# Patient Record
Sex: MALE | Race: WHITE | NOT HISPANIC OR LATINO | URBAN - METROPOLITAN AREA
[De-identification: names, ages, dates, MRNs, and addresses within clinical notes are randomized per-mention and may not be internally consistent; named-entity substitution may affect disease eponyms.]

---

## 2019-03-22 ENCOUNTER — EMERGENCY (EMERGENCY)
Facility: HOSPITAL | Age: 84
LOS: 1 days | Discharge: HOME | End: 2019-03-22
Attending: EMERGENCY MEDICINE
Payer: MEDICARE

## 2019-03-22 VITALS
RESPIRATION RATE: 16 BRPM | DIASTOLIC BLOOD PRESSURE: 71 MMHG | HEART RATE: 64 BPM | OXYGEN SATURATION: 98 % | WEIGHT: 139.99 LBS | SYSTOLIC BLOOD PRESSURE: 129 MMHG | TEMPERATURE: 98 F

## 2019-03-22 VITALS
SYSTOLIC BLOOD PRESSURE: 135 MMHG | HEART RATE: 63 BPM | RESPIRATION RATE: 16 BRPM | OXYGEN SATURATION: 100 % | DIASTOLIC BLOOD PRESSURE: 59 MMHG | TEMPERATURE: 99 F

## 2019-03-22 LAB
ALBUMIN SERPL ELPH-MCNC: 2.6 G/DL — LOW (ref 3.5–5)
ALP SERPL-CCNC: 93 U/L — SIGNIFICANT CHANGE UP (ref 40–120)
ALT FLD-CCNC: 36 U/L DA — SIGNIFICANT CHANGE UP (ref 10–60)
ANION GAP SERPL CALC-SCNC: 5 MMOL/L — SIGNIFICANT CHANGE UP (ref 5–17)
APPEARANCE UR: CLEAR — SIGNIFICANT CHANGE UP
APTT BLD: 33.4 SEC — SIGNIFICANT CHANGE UP (ref 27.5–36.3)
AST SERPL-CCNC: 19 U/L — SIGNIFICANT CHANGE UP (ref 10–40)
BASOPHILS # BLD AUTO: 0.04 K/UL — SIGNIFICANT CHANGE UP (ref 0–0.2)
BASOPHILS NFR BLD AUTO: 0.5 % — SIGNIFICANT CHANGE UP (ref 0–2)
BILIRUB SERPL-MCNC: 0.4 MG/DL — SIGNIFICANT CHANGE UP (ref 0.2–1.2)
BILIRUB UR-MCNC: NEGATIVE — SIGNIFICANT CHANGE UP
BUN SERPL-MCNC: 19 MG/DL — HIGH (ref 7–18)
CALCIUM SERPL-MCNC: 8.7 MG/DL — SIGNIFICANT CHANGE UP (ref 8.4–10.5)
CHLORIDE SERPL-SCNC: 104 MMOL/L — SIGNIFICANT CHANGE UP (ref 96–108)
CO2 SERPL-SCNC: 31 MMOL/L — SIGNIFICANT CHANGE UP (ref 22–31)
COLOR SPEC: YELLOW — SIGNIFICANT CHANGE UP
CREAT SERPL-MCNC: 1.11 MG/DL — SIGNIFICANT CHANGE UP (ref 0.5–1.3)
DIFF PNL FLD: ABNORMAL
EOSINOPHIL # BLD AUTO: 0.15 K/UL — SIGNIFICANT CHANGE UP (ref 0–0.5)
EOSINOPHIL NFR BLD AUTO: 1.8 % — SIGNIFICANT CHANGE UP (ref 0–6)
GLUCOSE SERPL-MCNC: 82 MG/DL — SIGNIFICANT CHANGE UP (ref 70–99)
GLUCOSE UR QL: NEGATIVE — SIGNIFICANT CHANGE UP
HCT VFR BLD CALC: 37.8 % — LOW (ref 39–50)
HGB BLD-MCNC: 12.1 G/DL — LOW (ref 13–17)
IMM GRANULOCYTES NFR BLD AUTO: 0.9 % — SIGNIFICANT CHANGE UP (ref 0–1.5)
INR BLD: 1.1 RATIO — SIGNIFICANT CHANGE UP (ref 0.88–1.16)
KETONES UR-MCNC: NEGATIVE — SIGNIFICANT CHANGE UP
LACTATE SERPL-SCNC: 1 MMOL/L — SIGNIFICANT CHANGE UP (ref 0.7–2)
LEUKOCYTE ESTERASE UR-ACNC: ABNORMAL
LYMPHOCYTES # BLD AUTO: 1.62 K/UL — SIGNIFICANT CHANGE UP (ref 1–3.3)
LYMPHOCYTES # BLD AUTO: 19 % — SIGNIFICANT CHANGE UP (ref 13–44)
MCHC RBC-ENTMCNC: 29.5 PG — SIGNIFICANT CHANGE UP (ref 27–34)
MCHC RBC-ENTMCNC: 32 GM/DL — SIGNIFICANT CHANGE UP (ref 32–36)
MCV RBC AUTO: 92.2 FL — SIGNIFICANT CHANGE UP (ref 80–100)
MONOCYTES # BLD AUTO: 0.97 K/UL — HIGH (ref 0–0.9)
MONOCYTES NFR BLD AUTO: 11.4 % — SIGNIFICANT CHANGE UP (ref 2–14)
NEUTROPHILS # BLD AUTO: 5.67 K/UL — SIGNIFICANT CHANGE UP (ref 1.8–7.4)
NEUTROPHILS NFR BLD AUTO: 66.4 % — SIGNIFICANT CHANGE UP (ref 43–77)
NITRITE UR-MCNC: NEGATIVE — SIGNIFICANT CHANGE UP
NRBC # BLD: 0 /100 WBCS — SIGNIFICANT CHANGE UP (ref 0–0)
PH UR: 7 — SIGNIFICANT CHANGE UP (ref 5–8)
PLATELET # BLD AUTO: 289 K/UL — SIGNIFICANT CHANGE UP (ref 150–400)
POTASSIUM SERPL-MCNC: 4.1 MMOL/L — SIGNIFICANT CHANGE UP (ref 3.5–5.3)
POTASSIUM SERPL-SCNC: 4.1 MMOL/L — SIGNIFICANT CHANGE UP (ref 3.5–5.3)
PROT SERPL-MCNC: 6.9 G/DL — SIGNIFICANT CHANGE UP (ref 6–8.3)
PROT UR-MCNC: 15
PROTHROM AB SERPL-ACNC: 12.3 SEC — SIGNIFICANT CHANGE UP (ref 10–12.9)
RBC # BLD: 4.1 M/UL — LOW (ref 4.2–5.8)
RBC # FLD: 13 % — SIGNIFICANT CHANGE UP (ref 10.3–14.5)
SODIUM SERPL-SCNC: 140 MMOL/L — SIGNIFICANT CHANGE UP (ref 135–145)
SP GR SPEC: 1.01 — SIGNIFICANT CHANGE UP (ref 1.01–1.02)
TROPONIN I SERPL-MCNC: <0.015 NG/ML — SIGNIFICANT CHANGE UP (ref 0–0.04)
TSH SERPL-MCNC: 3.3 UU/ML — SIGNIFICANT CHANGE UP (ref 0.34–4.82)
UROBILINOGEN FLD QL: 1
WBC # BLD: 8.53 K/UL — SIGNIFICANT CHANGE UP (ref 3.8–10.5)
WBC # FLD AUTO: 8.53 K/UL — SIGNIFICANT CHANGE UP (ref 3.8–10.5)

## 2019-03-22 PROCEDURE — 71045 X-RAY EXAM CHEST 1 VIEW: CPT

## 2019-03-22 PROCEDURE — 93005 ELECTROCARDIOGRAM TRACING: CPT

## 2019-03-22 PROCEDURE — 80053 COMPREHEN METABOLIC PANEL: CPT

## 2019-03-22 PROCEDURE — 83605 ASSAY OF LACTIC ACID: CPT

## 2019-03-22 PROCEDURE — 85610 PROTHROMBIN TIME: CPT

## 2019-03-22 PROCEDURE — 85027 COMPLETE CBC AUTOMATED: CPT

## 2019-03-22 PROCEDURE — 74177 CT ABD & PELVIS W/CONTRAST: CPT

## 2019-03-22 PROCEDURE — 81001 URINALYSIS AUTO W/SCOPE: CPT

## 2019-03-22 PROCEDURE — 99284 EMERGENCY DEPT VISIT MOD MDM: CPT

## 2019-03-22 PROCEDURE — 85730 THROMBOPLASTIN TIME PARTIAL: CPT

## 2019-03-22 PROCEDURE — 82962 GLUCOSE BLOOD TEST: CPT

## 2019-03-22 PROCEDURE — 99284 EMERGENCY DEPT VISIT MOD MDM: CPT | Mod: 25

## 2019-03-22 PROCEDURE — 84443 ASSAY THYROID STIM HORMONE: CPT

## 2019-03-22 PROCEDURE — 36415 COLL VENOUS BLD VENIPUNCTURE: CPT

## 2019-03-22 PROCEDURE — 84484 ASSAY OF TROPONIN QUANT: CPT

## 2019-03-22 PROCEDURE — 74177 CT ABD & PELVIS W/CONTRAST: CPT | Mod: 26

## 2019-03-22 PROCEDURE — 71045 X-RAY EXAM CHEST 1 VIEW: CPT | Mod: 26

## 2019-03-22 RX ORDER — ACETAMINOPHEN 500 MG
1000 TABLET ORAL ONCE
Qty: 0 | Refills: 0 | Status: DISCONTINUED | OUTPATIENT
Start: 2019-03-22 | End: 2019-03-26

## 2019-03-22 RX ORDER — SODIUM CHLORIDE 9 MG/ML
1950 INJECTION INTRAMUSCULAR; INTRAVENOUS; SUBCUTANEOUS ONCE
Qty: 0 | Refills: 0 | Status: DISCONTINUED | OUTPATIENT
Start: 2019-03-22 | End: 2019-03-26

## 2019-03-22 NOTE — ED PROVIDER NOTE - OBJECTIVE STATEMENT
84 y/o M patient with a significant PMHx of Dementia and no significant PSHx coming from nursing home presents to the ED with abdominal pain. Patient states he doesn't know how long he has been experiencing abdominal pain. Patient says whenever he eats he suddenly has loose bowel movements after. Patient denies chest pain, SOB, fever, chills, back pain, and any other complaints. Patient endorses being a poor historian and states he generally feels unwell. Patient denies any other complaints. NKDA.

## 2019-03-22 NOTE — ED ADULT NURSE NOTE - OBJECTIVE STATEMENT
Pt states has abd cramps, diarrhea  Sent from [facility] for increased lethargy  On arrival to ED, pt was awake , alert. oriented x 2 , ambulating with steady gait

## 2019-03-22 NOTE — ED PROVIDER NOTE - PROGRESS NOTE DETAILS
Patient resting well. No pain. No bowel movements in the ED. Remains awake and alert at baseline mentation. Imaging and labs unremarkable. Spoke with Dr. Villalta and reviewed patient's care. Ok with DC back to facility. Will DC.

## 2019-03-22 NOTE — ED ADULT NURSE NOTE - NSIMPLEMENTINTERV_GEN_ALL_ED
Implemented All Fall Risk Interventions:  [place] to call system. Call bell, personal items and telephone within reach. Instruct patient to call for assistance. Room bathroom lighting operational. Non-slip footwear when patient is off stretcher. Physically safe environment: no spills, clutter or unnecessary equipment. Stretcher in lowest position, wheels locked, appropriate side rails in place. Provide visual cue, wrist band, yellow gown, etc. Monitor gait and stability. Monitor for mental status changes and reorient to person, place, and time. Review medications for side effects contributing to fall risk. Reinforce activity limits and safety measures with patient and family.

## 2019-05-26 ENCOUNTER — EMERGENCY (EMERGENCY)
Facility: HOSPITAL | Age: 84
LOS: 1 days | Discharge: HOME | End: 2019-05-26
Attending: EMERGENCY MEDICINE
Payer: MEDICARE

## 2019-05-26 VITALS
TEMPERATURE: 99 F | RESPIRATION RATE: 16 BRPM | SYSTOLIC BLOOD PRESSURE: 134 MMHG | DIASTOLIC BLOOD PRESSURE: 64 MMHG | HEART RATE: 56 BPM | WEIGHT: 119.93 LBS | HEIGHT: 67 IN | OXYGEN SATURATION: 98 %

## 2019-05-26 VITALS
HEART RATE: 64 BPM | RESPIRATION RATE: 18 BRPM | OXYGEN SATURATION: 98 % | SYSTOLIC BLOOD PRESSURE: 148 MMHG | TEMPERATURE: 98 F | DIASTOLIC BLOOD PRESSURE: 68 MMHG

## 2019-05-26 PROBLEM — F03.90 UNSPECIFIED DEMENTIA WITHOUT BEHAVIORAL DISTURBANCE: Chronic | Status: ACTIVE | Noted: 2019-03-22

## 2019-05-26 PROCEDURE — 73620 X-RAY EXAM OF FOOT: CPT | Mod: 26,RT

## 2019-05-26 PROCEDURE — 99284 EMERGENCY DEPT VISIT MOD MDM: CPT | Mod: 25

## 2019-05-26 PROCEDURE — 90471 IMMUNIZATION ADMIN: CPT

## 2019-05-26 PROCEDURE — 73620 X-RAY EXAM OF FOOT: CPT

## 2019-05-26 PROCEDURE — 70450 CT HEAD/BRAIN W/O DYE: CPT

## 2019-05-26 PROCEDURE — 93010 ELECTROCARDIOGRAM REPORT: CPT

## 2019-05-26 PROCEDURE — 93005 ELECTROCARDIOGRAM TRACING: CPT

## 2019-05-26 PROCEDURE — 70450 CT HEAD/BRAIN W/O DYE: CPT | Mod: 26

## 2019-05-26 PROCEDURE — 99285 EMERGENCY DEPT VISIT HI MDM: CPT

## 2019-05-26 PROCEDURE — 99285 EMERGENCY DEPT VISIT HI MDM: CPT | Mod: 25

## 2019-05-26 PROCEDURE — 36430 TRANSFUSION BLD/BLD COMPNT: CPT

## 2019-05-26 PROCEDURE — 90715 TDAP VACCINE 7 YRS/> IM: CPT

## 2019-05-26 RX ORDER — TETANUS TOXOID, REDUCED DIPHTHERIA TOXOID AND ACELLULAR PERTUSSIS VACCINE, ADSORBED 5; 2.5; 8; 8; 2.5 [IU]/.5ML; [IU]/.5ML; UG/.5ML; UG/.5ML; UG/.5ML
0.5 SUSPENSION INTRAMUSCULAR ONCE
Refills: 0 | Status: COMPLETED | OUTPATIENT
Start: 2019-05-26 | End: 2019-05-26

## 2019-05-26 RX ADMIN — TETANUS TOXOID, REDUCED DIPHTHERIA TOXOID AND ACELLULAR PERTUSSIS VACCINE, ADSORBED 0.5 MILLILITER(S): 5; 2.5; 8; 8; 2.5 SUSPENSION INTRAMUSCULAR at 11:01

## 2019-05-26 NOTE — ED ADULT NURSE NOTE - NSIMPLEMENTINTERV_GEN_ALL_ED
Implemented All Fall Risk Interventions:  [place] to call system. Call bell, personal items and telephone within reach. Instruct patient to call for assistance. Room bathroom lighting operational. Non-slip footwear when patient is off stretcher. Physically safe environment: no spills, clutter or unnecessary equipment. Stretcher in lowest position, wheels locked, appropriate side rails in place. Provide visual cue, wrist band, yellow gown, etc. Monitor gait and stability. Monitor for mental status changes and reorient to person, place, and time. Review medications for side effects contributing to fall risk. Reinforce activity limits and safety measures with patient and family.

## 2019-05-26 NOTE — ED PROVIDER NOTE - CARE PLAN
Principal Discharge DX:	Toenail avulsion, initial encounter Principal Discharge DX:	Toenail avulsion, initial encounter  Secondary Diagnosis:	Fall, initial encounter

## 2019-05-26 NOTE — ED PROVIDER NOTE - OBJECTIVE STATEMENT
84 y/o M patient with a significant PMHx of Dementia and no significant PSHx presents to the ED with s/p trip and fall. Patient is bleeding form the right toe. Patient history limited due to dementia. NKDA.

## 2019-05-26 NOTE — ED ADULT TRIAGE NOTE - CHIEF COMPLAINT QUOTE
send from nursing home for trip and fall in the bathroom. c/o L toe pain. denies head  injury or trauma. denies LOC

## 2019-07-09 ENCOUNTER — EMERGENCY (EMERGENCY)
Facility: HOSPITAL | Age: 84
LOS: 1 days | Discharge: HOME | End: 2019-07-09
Attending: EMERGENCY MEDICINE
Payer: MEDICARE

## 2019-07-09 VITALS
RESPIRATION RATE: 18 BRPM | DIASTOLIC BLOOD PRESSURE: 60 MMHG | TEMPERATURE: 98 F | SYSTOLIC BLOOD PRESSURE: 120 MMHG | HEART RATE: 58 BPM | OXYGEN SATURATION: 99 %

## 2019-07-09 PROCEDURE — 73070 X-RAY EXAM OF ELBOW: CPT | Mod: 26,RT

## 2019-07-09 PROCEDURE — 99285 EMERGENCY DEPT VISIT HI MDM: CPT

## 2019-07-09 PROCEDURE — 70450 CT HEAD/BRAIN W/O DYE: CPT | Mod: 26

## 2019-07-09 PROCEDURE — 70450 CT HEAD/BRAIN W/O DYE: CPT

## 2019-07-09 PROCEDURE — 73070 X-RAY EXAM OF ELBOW: CPT

## 2019-07-09 PROCEDURE — 99285 EMERGENCY DEPT VISIT HI MDM: CPT | Mod: 25

## 2019-07-09 PROCEDURE — 90715 TDAP VACCINE 7 YRS/> IM: CPT

## 2019-07-09 PROCEDURE — 90471 IMMUNIZATION ADMIN: CPT

## 2019-07-09 RX ORDER — ACETAMINOPHEN 500 MG
650 TABLET ORAL ONCE
Refills: 0 | Status: COMPLETED | OUTPATIENT
Start: 2019-07-09 | End: 2019-07-09

## 2019-07-09 RX ORDER — TETANUS TOXOID, REDUCED DIPHTHERIA TOXOID AND ACELLULAR PERTUSSIS VACCINE, ADSORBED 5; 2.5; 8; 8; 2.5 [IU]/.5ML; [IU]/.5ML; UG/.5ML; UG/.5ML; UG/.5ML
0.5 SUSPENSION INTRAMUSCULAR ONCE
Refills: 0 | Status: COMPLETED | OUTPATIENT
Start: 2019-07-09 | End: 2019-07-09

## 2019-07-09 RX ADMIN — TETANUS TOXOID, REDUCED DIPHTHERIA TOXOID AND ACELLULAR PERTUSSIS VACCINE, ADSORBED 0.5 MILLILITER(S): 5; 2.5; 8; 8; 2.5 SUSPENSION INTRAMUSCULAR at 13:57

## 2019-07-09 RX ADMIN — Medication 650 MILLIGRAM(S): at 13:54

## 2019-07-09 NOTE — ED ADULT NURSE NOTE - NSIMPLEMENTINTERV_GEN_ALL_ED
Implemented All Universal Safety Interventions:  [place] to call system. Call bell, personal items and telephone within reach. Instruct patient to call for assistance. Room bathroom lighting operational. Non-slip footwear when patient is off stretcher. Physically safe environment: no spills, clutter or unnecessary equipment. Stretcher in lowest position, wheels locked, appropriate side rails in place.

## 2019-07-09 NOTE — ED PROVIDER NOTE - MUSCULOSKELETAL, MLM
Spine appears kyphotic, range of motion is not limited, no muscle or joint tenderness, Rt elbow- FROM, min effusion with superficial abrasion

## 2019-07-09 NOTE — ED ADULT NURSE NOTE - OBJECTIVE STATEMENT
BIB EMS alet and verbally responsive S/P mech fall today  +head trauma no LOC noted sustained abrasion to R elbow noted

## 2019-07-09 NOTE — ED PROVIDER NOTE - CARE PLAN
Principal Discharge DX:	Fall  Secondary Diagnosis:	Head injury  Secondary Diagnosis:	Elbow injury  Secondary Diagnosis:	Abrasion

## 2019-07-09 NOTE — ED PROVIDER NOTE - OBJECTIVE STATEMENT
84 y.o. male AL resident BIBA pt claims he got into the shower this am, pt slipped & fell, pt hit Rt sided head, no LOC, pt also sustained abrasion to Rt elbow. c/o Rt sided HA, pt walks with a walker, denies other injuries neck pain, CP, SOB, last tetanus-?

## 2019-07-15 ENCOUNTER — EMERGENCY (EMERGENCY)
Facility: HOSPITAL | Age: 84
LOS: 1 days | Discharge: HOME | End: 2019-07-15
Attending: EMERGENCY MEDICINE
Payer: MEDICARE

## 2019-07-15 VITALS
DIASTOLIC BLOOD PRESSURE: 63 MMHG | SYSTOLIC BLOOD PRESSURE: 152 MMHG | RESPIRATION RATE: 20 BRPM | TEMPERATURE: 97 F | HEART RATE: 55 BPM | OXYGEN SATURATION: 100 %

## 2019-07-15 VITALS
TEMPERATURE: 98 F | SYSTOLIC BLOOD PRESSURE: 139 MMHG | RESPIRATION RATE: 20 BRPM | OXYGEN SATURATION: 100 % | WEIGHT: 130.07 LBS | DIASTOLIC BLOOD PRESSURE: 80 MMHG | HEIGHT: 64 IN | HEART RATE: 53 BPM

## 2019-07-15 PROBLEM — F32.9 MAJOR DEPRESSIVE DISORDER, SINGLE EPISODE, UNSPECIFIED: Chronic | Status: ACTIVE | Noted: 2019-07-09

## 2019-07-15 PROBLEM — E03.9 HYPOTHYROIDISM, UNSPECIFIED: Chronic | Status: ACTIVE | Noted: 2019-07-09

## 2019-07-15 LAB
ALBUMIN SERPL ELPH-MCNC: 3.2 G/DL — LOW (ref 3.5–5)
ALP SERPL-CCNC: 120 U/L — SIGNIFICANT CHANGE UP (ref 40–120)
ALT FLD-CCNC: 34 U/L DA — SIGNIFICANT CHANGE UP (ref 10–60)
ANION GAP SERPL CALC-SCNC: 7 MMOL/L — SIGNIFICANT CHANGE UP (ref 5–17)
AST SERPL-CCNC: 23 U/L — SIGNIFICANT CHANGE UP (ref 10–40)
BASOPHILS # BLD AUTO: 0.04 K/UL — SIGNIFICANT CHANGE UP (ref 0–0.2)
BASOPHILS NFR BLD AUTO: 0.6 % — SIGNIFICANT CHANGE UP (ref 0–2)
BILIRUB SERPL-MCNC: 0.5 MG/DL — SIGNIFICANT CHANGE UP (ref 0.2–1.2)
BUN SERPL-MCNC: 22 MG/DL — HIGH (ref 7–18)
CALCIUM SERPL-MCNC: 9 MG/DL — SIGNIFICANT CHANGE UP (ref 8.4–10.5)
CHLORIDE SERPL-SCNC: 105 MMOL/L — SIGNIFICANT CHANGE UP (ref 96–108)
CO2 SERPL-SCNC: 28 MMOL/L — SIGNIFICANT CHANGE UP (ref 22–31)
CREAT SERPL-MCNC: 1.14 MG/DL — SIGNIFICANT CHANGE UP (ref 0.5–1.3)
EOSINOPHIL # BLD AUTO: 0.28 K/UL — SIGNIFICANT CHANGE UP (ref 0–0.5)
EOSINOPHIL NFR BLD AUTO: 3.9 % — SIGNIFICANT CHANGE UP (ref 0–6)
GLUCOSE SERPL-MCNC: 77 MG/DL — SIGNIFICANT CHANGE UP (ref 70–99)
HCT VFR BLD CALC: 39.9 % — SIGNIFICANT CHANGE UP (ref 39–50)
HGB BLD-MCNC: 13.2 G/DL — SIGNIFICANT CHANGE UP (ref 13–17)
IMM GRANULOCYTES NFR BLD AUTO: 0.4 % — SIGNIFICANT CHANGE UP (ref 0–1.5)
LYMPHOCYTES # BLD AUTO: 2.09 K/UL — SIGNIFICANT CHANGE UP (ref 1–3.3)
LYMPHOCYTES # BLD AUTO: 29.3 % — SIGNIFICANT CHANGE UP (ref 13–44)
MCHC RBC-ENTMCNC: 29.5 PG — SIGNIFICANT CHANGE UP (ref 27–34)
MCHC RBC-ENTMCNC: 33.1 GM/DL — SIGNIFICANT CHANGE UP (ref 32–36)
MCV RBC AUTO: 89.1 FL — SIGNIFICANT CHANGE UP (ref 80–100)
MONOCYTES # BLD AUTO: 0.74 K/UL — SIGNIFICANT CHANGE UP (ref 0–0.9)
MONOCYTES NFR BLD AUTO: 10.4 % — SIGNIFICANT CHANGE UP (ref 2–14)
NEUTROPHILS # BLD AUTO: 3.95 K/UL — SIGNIFICANT CHANGE UP (ref 1.8–7.4)
NEUTROPHILS NFR BLD AUTO: 55.4 % — SIGNIFICANT CHANGE UP (ref 43–77)
NRBC # BLD: 0 /100 WBCS — SIGNIFICANT CHANGE UP (ref 0–0)
PLATELET # BLD AUTO: 186 K/UL — SIGNIFICANT CHANGE UP (ref 150–400)
POTASSIUM SERPL-MCNC: 4 MMOL/L — SIGNIFICANT CHANGE UP (ref 3.5–5.3)
POTASSIUM SERPL-SCNC: 4 MMOL/L — SIGNIFICANT CHANGE UP (ref 3.5–5.3)
PROT SERPL-MCNC: 6.9 G/DL — SIGNIFICANT CHANGE UP (ref 6–8.3)
RBC # BLD: 4.48 M/UL — SIGNIFICANT CHANGE UP (ref 4.2–5.8)
RBC # FLD: 14.8 % — HIGH (ref 10.3–14.5)
SODIUM SERPL-SCNC: 140 MMOL/L — SIGNIFICANT CHANGE UP (ref 135–145)
TROPONIN I SERPL-MCNC: <0.015 NG/ML — SIGNIFICANT CHANGE UP (ref 0–0.04)
WBC # BLD: 7.13 K/UL — SIGNIFICANT CHANGE UP (ref 3.8–10.5)
WBC # FLD AUTO: 7.13 K/UL — SIGNIFICANT CHANGE UP (ref 3.8–10.5)

## 2019-07-15 PROCEDURE — 72125 CT NECK SPINE W/O DYE: CPT | Mod: 26

## 2019-07-15 PROCEDURE — 70450 CT HEAD/BRAIN W/O DYE: CPT | Mod: 26

## 2019-07-15 PROCEDURE — 73630 X-RAY EXAM OF FOOT: CPT | Mod: 26,LT

## 2019-07-15 PROCEDURE — 84484 ASSAY OF TROPONIN QUANT: CPT

## 2019-07-15 PROCEDURE — 72125 CT NECK SPINE W/O DYE: CPT

## 2019-07-15 PROCEDURE — 93005 ELECTROCARDIOGRAM TRACING: CPT

## 2019-07-15 PROCEDURE — 36415 COLL VENOUS BLD VENIPUNCTURE: CPT

## 2019-07-15 PROCEDURE — 73630 X-RAY EXAM OF FOOT: CPT

## 2019-07-15 PROCEDURE — 80053 COMPREHEN METABOLIC PANEL: CPT

## 2019-07-15 PROCEDURE — 99284 EMERGENCY DEPT VISIT MOD MDM: CPT | Mod: 25

## 2019-07-15 PROCEDURE — 85027 COMPLETE CBC AUTOMATED: CPT

## 2019-07-15 PROCEDURE — 72170 X-RAY EXAM OF PELVIS: CPT

## 2019-07-15 PROCEDURE — 70450 CT HEAD/BRAIN W/O DYE: CPT

## 2019-07-15 PROCEDURE — 72170 X-RAY EXAM OF PELVIS: CPT | Mod: 26

## 2019-07-15 PROCEDURE — 99285 EMERGENCY DEPT VISIT HI MDM: CPT

## 2019-07-15 PROCEDURE — 82962 GLUCOSE BLOOD TEST: CPT

## 2019-07-15 NOTE — ED PROVIDER NOTE - OBJECTIVE STATEMENT
85 y/o male with PMHx of dementia, frequent falls sent in from [facility] to the ED for eval s/p fall x today. Pt noted to be a poor historian but states he would occasionally feel dizzy and fall. Pt in ED only with L foot pain. Pt denies numbness, tingling, or any other complaints. NKDA.

## 2019-07-15 NOTE — ED ADULT NURSE NOTE - NSIMPLEMENTINTERV_GEN_ALL_ED
Implemented All Universal Safety Interventions:  [place] to call system. Call bell, personal items and telephone within reach. Instruct patient to call for assistance. Room bathroom lighting operational. Non-slip footwear when patient is off stretcher. Physically safe environment: no spills, clutter or unnecessary equipment. Stretcher in lowest position, wheels locked, appropriate side rails in place.

## 2019-07-15 NOTE — ED PROVIDER NOTE - CLINICAL SUMMARY MEDICAL DECISION MAKING FREE TEXT BOX
84 M with possible fall x today. Given age and dementia, will CT head and c-spine, check labs and reassess.

## 2019-07-15 NOTE — ED ADULT NURSE NOTE - OBJECTIVE STATEMENT
pt is here for weakness.  pt stated that feeling dizzy, c/o weakness, denied sob or fever, denied N/V/D or chest pain, pt calm at this time.

## 2019-07-15 NOTE — ED PROVIDER NOTE - CONSTITUTIONAL, MLM
normal... Well appearing, well nourished, awake, alert, oriented to person, place, time/situation and in no apparent distress. yes

## 2019-07-15 NOTE — ED PROVIDER NOTE - MUSCULOSKELETAL MINIMAL EXAM
L foot with no tenderness/deformity/edema, pt with numerous aged ecchymoses but no acute ecchymoses noted

## 2019-07-15 NOTE — ED PROVIDER NOTE - PROGRESS NOTE DETAILS
Spoke with Dr. Villalta, if CT negative with nml labs, can d/c pt back to [facility]. Gregorio: s/o from Dr segura to f/u on imaging.  ct head and cervical no acute injury.  pelvis and left foot neg for acute injury.  pt able to stand up.  states wants to go back to [facility].   dx fall.  ambulance back to [facility]

## 2020-02-07 ENCOUNTER — EMERGENCY (EMERGENCY)
Facility: HOSPITAL | Age: 85
LOS: 1 days | Discharge: HOME | End: 2020-02-07
Attending: EMERGENCY MEDICINE
Payer: MEDICARE

## 2020-02-07 VITALS
RESPIRATION RATE: 16 BRPM | TEMPERATURE: 99 F | SYSTOLIC BLOOD PRESSURE: 131 MMHG | DIASTOLIC BLOOD PRESSURE: 68 MMHG | WEIGHT: 139.99 LBS | HEART RATE: 62 BPM | HEIGHT: 63 IN | OXYGEN SATURATION: 97 %

## 2020-02-07 PROCEDURE — 99284 EMERGENCY DEPT VISIT MOD MDM: CPT | Mod: 25

## 2020-02-07 PROCEDURE — 93005 ELECTROCARDIOGRAM TRACING: CPT

## 2020-02-07 PROCEDURE — 99285 EMERGENCY DEPT VISIT HI MDM: CPT

## 2020-02-07 PROCEDURE — 72125 CT NECK SPINE W/O DYE: CPT | Mod: 26

## 2020-02-07 PROCEDURE — 72125 CT NECK SPINE W/O DYE: CPT

## 2020-02-07 PROCEDURE — 70450 CT HEAD/BRAIN W/O DYE: CPT | Mod: 26

## 2020-02-07 PROCEDURE — 70450 CT HEAD/BRAIN W/O DYE: CPT

## 2020-02-07 NOTE — ED PROVIDER NOTE - ENMT, MLM
Airway patent, Nasal mucosa clear. Mouth with normal mucosa. Throat has no vesicles, no oropharyngeal exudates and uvula is midline. Head atraumatic and normocephalic with no ecchymosis.

## 2020-02-07 NOTE — ED PROVIDER NOTE - OBJECTIVE STATEMENT
84 year old male with PMHx of dementia, depression, hypothyroidism, and frequent falls and no pertinent PSHx presents to the ED from [facility] S/P a mechanical fall today. Patient reports that he stood up quickly from his bed, developed some dizziness, and then fell landing on the right side of his head. Patient states that he was able to get up independently after the fall. Patient is presenting to the ED because his assisted living facility wanted him to undergo a CAT scan. In the ED, patient states that he is only here for a CAT scan so that he can go home soon. Patient otherwise denies any loss of consciousness, chest pain, shortness of breath, pain, and all other acute complaints. Patient denies any blood thinner use. NKDA.

## 2020-02-07 NOTE — ED ADULT NURSE NOTE - NS ED NOTE ABUSE SUSPICION NEGLECT YN
Agency/Facility Name: [facility]   Spoke To: Rozina  Outcome: Rozina to follow up with this [facility] tomorrow regarding bed availability.      No

## 2020-02-07 NOTE — ED PROVIDER NOTE - PATIENT PORTAL LINK FT
You can access the FollowMyHealth Patient Portal offered by [facility] by registering at the following website: [email or web address]. By joining [employer]’s FollowMyHealth portal, you will also be able to view your health information using other applications (apps) compatible with our system.

## 2020-02-07 NOTE — ED ADULT NURSE NOTE - OBJECTIVE STATEMENT
Patient fell on buttocks while standing, hit the back of head on carpeted floor.  No LOC.  Sent from [facility] for evaluation.  Pt states he fell in his bedroom at the [facility], c/o of lower back pain due to fall. Pt states he ambulates with assistance with his cane.

## 2020-02-07 NOTE — ED PROVIDER NOTE - PROGRESS NOTE DETAILS
Ervin: signed out for f/u ct. ct negative for acute pathology. will dc back to [facility]. f/u PMD. return precautions discussed.

## 2020-02-07 NOTE — ED ADULT TRIAGE NOTE - CHIEF COMPLAINT QUOTE
Patient fell on buttocks while standing, hit the back of head on carpeted floor.  No LOC.  Sent from Assisted Living Home for evaluation

## 2020-02-07 NOTE — ED ADULT NURSE NOTE - NSIMPLEMENTINTERV_GEN_ALL_ED
Implemented All Fall with Harm Risk Interventions:  [place] to call system. Call bell, personal items and telephone within reach. Instruct patient to call for assistance. Room bathroom lighting operational. Non-slip footwear when patient is off stretcher. Physically safe environment: no spills, clutter or unnecessary equipment. Stretcher in lowest position, wheels locked, appropriate side rails in place. Provide visual cue, wrist band, yellow gown, etc. Monitor gait and stability. Monitor for mental status changes and reorient to person, place, and time. Review medications for side effects contributing to fall risk. Reinforce activity limits and safety measures with patient and family. Provide visual clues: red socks.

## 2020-02-07 NOTE — ED PROVIDER NOTE - CLINICAL SUMMARY MEDICAL DECISION MAKING FREE TEXT BOX
Patient presents to the ED with complaints of a fall and blunt head trauma secondary to likely abrupt positional changes/ orthostatic hypothyroidism. Will check head CT/ cervical neck, and EKG. Will discharge if back to normal.

## 2020-02-08 VITALS
TEMPERATURE: 99 F | RESPIRATION RATE: 16 BRPM | HEART RATE: 67 BPM | DIASTOLIC BLOOD PRESSURE: 67 MMHG | SYSTOLIC BLOOD PRESSURE: 143 MMHG | OXYGEN SATURATION: 100 %

## 2020-02-08 PROBLEM — R29.6 REPEATED FALLS: Chronic | Status: ACTIVE | Noted: 2019-07-18

## 2020-02-08 NOTE — ED ADULT NURSE REASSESSMENT NOTE - NS ED NURSE REASSESS COMMENT FT1
Pt discharged, called facility [facility], spoke with RN supervisor Dariusz Larkin to inform about patient's transport back to facility. Pt wheeled on wheelchair in no distress by [facility], no IV.

## 2020-05-14 ENCOUNTER — EMERGENCY (EMERGENCY)
Facility: HOSPITAL | Age: 85
LOS: 1 days | Discharge: HOME | End: 2020-05-14
Attending: EMERGENCY MEDICINE
Payer: MEDICARE

## 2020-05-14 VITALS
DIASTOLIC BLOOD PRESSURE: 75 MMHG | SYSTOLIC BLOOD PRESSURE: 177 MMHG | TEMPERATURE: 98 F | HEART RATE: 63 BPM | RESPIRATION RATE: 18 BRPM | OXYGEN SATURATION: 100 %

## 2020-05-14 VITALS
SYSTOLIC BLOOD PRESSURE: 122 MMHG | WEIGHT: 119.93 LBS | OXYGEN SATURATION: 99 % | TEMPERATURE: 98 F | DIASTOLIC BLOOD PRESSURE: 81 MMHG | RESPIRATION RATE: 19 BRPM | HEART RATE: 61 BPM | HEIGHT: 66 IN

## 2020-05-14 LAB
ACETONE SERPL-MCNC: NEGATIVE — SIGNIFICANT CHANGE UP
ALBUMIN SERPL ELPH-MCNC: 3.1 G/DL — LOW (ref 3.5–5)
ALP SERPL-CCNC: 108 U/L — SIGNIFICANT CHANGE UP (ref 40–120)
ALT FLD-CCNC: 13 U/L DA — SIGNIFICANT CHANGE UP (ref 10–60)
ANION GAP SERPL CALC-SCNC: 5 MMOL/L — SIGNIFICANT CHANGE UP (ref 5–17)
AST SERPL-CCNC: 17 U/L — SIGNIFICANT CHANGE UP (ref 10–40)
BASOPHILS # BLD AUTO: 0.03 K/UL — SIGNIFICANT CHANGE UP (ref 0–0.2)
BASOPHILS NFR BLD AUTO: 0.5 % — SIGNIFICANT CHANGE UP (ref 0–2)
BILIRUB SERPL-MCNC: 0.5 MG/DL — SIGNIFICANT CHANGE UP (ref 0.2–1.2)
BUN SERPL-MCNC: 22 MG/DL — HIGH (ref 7–18)
CALCIUM SERPL-MCNC: 8.8 MG/DL — SIGNIFICANT CHANGE UP (ref 8.4–10.5)
CHLORIDE SERPL-SCNC: 107 MMOL/L — SIGNIFICANT CHANGE UP (ref 96–108)
CK SERPL-CCNC: 61 U/L — SIGNIFICANT CHANGE UP (ref 35–232)
CO2 SERPL-SCNC: 30 MMOL/L — SIGNIFICANT CHANGE UP (ref 22–31)
CREAT SERPL-MCNC: 1.07 MG/DL — SIGNIFICANT CHANGE UP (ref 0.5–1.3)
D DIMER BLD IA.RAPID-MCNC: 453 NG/ML DDU — HIGH
EOSINOPHIL # BLD AUTO: 0.14 K/UL — SIGNIFICANT CHANGE UP (ref 0–0.5)
EOSINOPHIL NFR BLD AUTO: 2.3 % — SIGNIFICANT CHANGE UP (ref 0–6)
FIBRINOGEN PPP-MCNC: 534 MG/DL — HIGH (ref 350–510)
GLUCOSE SERPL-MCNC: 70 MG/DL — SIGNIFICANT CHANGE UP (ref 70–99)
HCT VFR BLD CALC: 39.7 % — SIGNIFICANT CHANGE UP (ref 39–50)
HGB BLD-MCNC: 13.2 G/DL — SIGNIFICANT CHANGE UP (ref 13–17)
IMM GRANULOCYTES NFR BLD AUTO: 0.3 % — SIGNIFICANT CHANGE UP (ref 0–1.5)
LACTATE SERPL-SCNC: 1.3 MMOL/L — SIGNIFICANT CHANGE UP (ref 0.7–2)
LYMPHOCYTES # BLD AUTO: 1.91 K/UL — SIGNIFICANT CHANGE UP (ref 1–3.3)
LYMPHOCYTES # BLD AUTO: 30.9 % — SIGNIFICANT CHANGE UP (ref 13–44)
MAGNESIUM SERPL-MCNC: 2.2 MG/DL — SIGNIFICANT CHANGE UP (ref 1.6–2.6)
MCHC RBC-ENTMCNC: 30.1 PG — SIGNIFICANT CHANGE UP (ref 27–34)
MCHC RBC-ENTMCNC: 33.2 GM/DL — SIGNIFICANT CHANGE UP (ref 32–36)
MCV RBC AUTO: 90.6 FL — SIGNIFICANT CHANGE UP (ref 80–100)
MONOCYTES # BLD AUTO: 0.74 K/UL — SIGNIFICANT CHANGE UP (ref 0–0.9)
MONOCYTES NFR BLD AUTO: 12 % — SIGNIFICANT CHANGE UP (ref 2–14)
NEUTROPHILS # BLD AUTO: 3.34 K/UL — SIGNIFICANT CHANGE UP (ref 1.8–7.4)
NEUTROPHILS NFR BLD AUTO: 54 % — SIGNIFICANT CHANGE UP (ref 43–77)
NRBC # BLD: 0 /100 WBCS — SIGNIFICANT CHANGE UP (ref 0–0)
NT-PROBNP SERPL-SCNC: 324 PG/ML — SIGNIFICANT CHANGE UP (ref 0–450)
PLATELET # BLD AUTO: 202 K/UL — SIGNIFICANT CHANGE UP (ref 150–400)
POTASSIUM SERPL-MCNC: 3.6 MMOL/L — SIGNIFICANT CHANGE UP (ref 3.5–5.3)
POTASSIUM SERPL-SCNC: 3.6 MMOL/L — SIGNIFICANT CHANGE UP (ref 3.5–5.3)
PROT SERPL-MCNC: 7 G/DL — SIGNIFICANT CHANGE UP (ref 6–8.3)
RBC # BLD: 4.38 M/UL — SIGNIFICANT CHANGE UP (ref 4.2–5.8)
RBC # FLD: 13.2 % — SIGNIFICANT CHANGE UP (ref 10.3–14.5)
SODIUM SERPL-SCNC: 142 MMOL/L — SIGNIFICANT CHANGE UP (ref 135–145)
TROPONIN I SERPL-MCNC: <0.015 NG/ML — SIGNIFICANT CHANGE UP (ref 0–0.04)
WBC # BLD: 6.18 K/UL — SIGNIFICANT CHANGE UP (ref 3.8–10.5)
WBC # FLD AUTO: 6.18 K/UL — SIGNIFICANT CHANGE UP (ref 3.8–10.5)

## 2020-05-14 PROCEDURE — 85027 COMPLETE CBC AUTOMATED: CPT

## 2020-05-14 PROCEDURE — 83605 ASSAY OF LACTIC ACID: CPT

## 2020-05-14 PROCEDURE — 36415 COLL VENOUS BLD VENIPUNCTURE: CPT

## 2020-05-14 PROCEDURE — 71045 X-RAY EXAM CHEST 1 VIEW: CPT | Mod: 26

## 2020-05-14 PROCEDURE — 85379 FIBRIN DEGRADATION QUANT: CPT

## 2020-05-14 PROCEDURE — 82550 ASSAY OF CK (CPK): CPT

## 2020-05-14 PROCEDURE — 87040 BLOOD CULTURE FOR BACTERIA: CPT

## 2020-05-14 PROCEDURE — U0003: CPT

## 2020-05-14 PROCEDURE — 71045 X-RAY EXAM CHEST 1 VIEW: CPT

## 2020-05-14 PROCEDURE — 83880 ASSAY OF NATRIURETIC PEPTIDE: CPT

## 2020-05-14 PROCEDURE — 82962 GLUCOSE BLOOD TEST: CPT

## 2020-05-14 PROCEDURE — 84145 PROCALCITONIN (PCT): CPT

## 2020-05-14 PROCEDURE — 84484 ASSAY OF TROPONIN QUANT: CPT

## 2020-05-14 PROCEDURE — 83735 ASSAY OF MAGNESIUM: CPT

## 2020-05-14 PROCEDURE — 99284 EMERGENCY DEPT VISIT MOD MDM: CPT | Mod: 25

## 2020-05-14 PROCEDURE — 93010 ELECTROCARDIOGRAM REPORT: CPT

## 2020-05-14 PROCEDURE — 99285 EMERGENCY DEPT VISIT HI MDM: CPT

## 2020-05-14 PROCEDURE — 71275 CT ANGIOGRAPHY CHEST: CPT | Mod: 26

## 2020-05-14 PROCEDURE — 93005 ELECTROCARDIOGRAM TRACING: CPT

## 2020-05-14 PROCEDURE — 85384 FIBRINOGEN ACTIVITY: CPT

## 2020-05-14 PROCEDURE — 82728 ASSAY OF FERRITIN: CPT

## 2020-05-14 PROCEDURE — 83615 LACTATE (LD) (LDH) ENZYME: CPT

## 2020-05-14 PROCEDURE — 85610 PROTHROMBIN TIME: CPT

## 2020-05-14 PROCEDURE — 71275 CT ANGIOGRAPHY CHEST: CPT

## 2020-05-14 PROCEDURE — 80053 COMPREHEN METABOLIC PANEL: CPT

## 2020-05-14 PROCEDURE — 86140 C-REACTIVE PROTEIN: CPT

## 2020-05-14 PROCEDURE — 82009 KETONE BODYS QUAL: CPT

## 2020-05-14 RX ORDER — SODIUM CHLORIDE 9 MG/ML
1000 INJECTION INTRAMUSCULAR; INTRAVENOUS; SUBCUTANEOUS
Refills: 0 | Status: DISCONTINUED | OUTPATIENT
Start: 2020-05-14 | End: 2020-05-18

## 2020-05-14 RX ADMIN — SODIUM CHLORIDE 1000 MILLILITER(S): 9 INJECTION INTRAMUSCULAR; INTRAVENOUS; SUBCUTANEOUS at 22:11

## 2020-05-14 RX ADMIN — SODIUM CHLORIDE 125 MILLILITER(S): 9 INJECTION INTRAMUSCULAR; INTRAVENOUS; SUBCUTANEOUS at 17:51

## 2020-05-14 NOTE — ED PROVIDER NOTE - PATIENT PORTAL LINK FT
You can access the FollowMyHealth Patient Portal offered by [facility] by registering at the following website: [email or web address]. By joining [employer]’s FollowMyHealth portal, you will also be able to view your health information using other applications (apps) compatible with our system.

## 2020-05-14 NOTE — ED ADULT NURSE NOTE - OBJECTIVE STATEMENT
patient complain of weakness for weeks patient complain of weakness for weeks. Patient has an history of intermittent confusion due to dementia. But he was able to tell me name,  and where he was.

## 2020-05-14 NOTE — ED PROVIDER NOTE - OBJECTIVE STATEMENT
84 y.o. AL male BIBA reportedly with generalized weakness, no appetite, COVID exposure.  Pt denies any pain, admits to feeling hungry.  Pt ambulates with a walker.  Unable to obtain further informations from pt.

## 2020-05-14 NOTE — ED PROVIDER NOTE - PROGRESS NOTE DETAILS
spoke with pt's niece, since d-dimer-sl. elevated, will get CTA Pt with no PE, RLL groundglass opacity, since pt with no hypoxemia, no coughing since in ED, will not give antibiotics, will d/c to NH.  Case d/w Dr. Villalta & pt's niece.  Dr. Villalta will f/u pt's COVID result as outpt.  Pt c/o feeling hungry, pt ate, tolerated well.

## 2020-05-14 NOTE — ED PROVIDER NOTE - ENMT, MLM
Airway patent, Nasal mucosa clear. Mouth with normal mucosa. Throat has no vesicles, no oropharyngeal exudates and uvula is midline. Airway patent, Nasal mucosa clear. Mouth with Moderate dry mucosa. Throat has no vesicles, no oropharyngeal exudates and uvula is midline.

## 2020-05-14 NOTE — ED ADULT NURSE NOTE - CHPI ED NUR SYMPTOMS NEG
no tingling/no weakness/no dizziness/no fever/no nausea/no pain/no chills/no vomiting/no decreased eating/drinking

## 2020-05-14 NOTE — ED PROVIDER NOTE - CLINICAL SUMMARY MEDICAL DECISION MAKING FREE TEXT BOX
weakness, no appetite, concern for infectious process, metabolic imbalance, will get labs, COVID, reassess

## 2020-05-15 ENCOUNTER — INPATIENT (INPATIENT)
Facility: HOSPITAL | Age: 85
LOS: 23 days | Discharge: OTHER INSTITUTION | DRG: 178 | End: 2020-06-08
Attending: INTERNAL MEDICINE | Admitting: INTERNAL MEDICINE
Payer: MEDICARE

## 2020-05-15 VITALS
SYSTOLIC BLOOD PRESSURE: 121 MMHG | TEMPERATURE: 98 F | HEIGHT: 66 IN | HEART RATE: 66 BPM | OXYGEN SATURATION: 96 % | RESPIRATION RATE: 16 BRPM | WEIGHT: 130.07 LBS | DIASTOLIC BLOOD PRESSURE: 71 MMHG

## 2020-05-15 DIAGNOSIS — F03.90 UNSPECIFIED DEMENTIA WITHOUT BEHAVIORAL DISTURBANCE: ICD-10-CM

## 2020-05-15 DIAGNOSIS — U07.1 COVID-19: ICD-10-CM

## 2020-05-15 DIAGNOSIS — E03.9 HYPOTHYROIDISM, UNSPECIFIED: ICD-10-CM

## 2020-05-15 DIAGNOSIS — Z29.9 ENCOUNTER FOR PROPHYLACTIC MEASURES, UNSPECIFIED: ICD-10-CM

## 2020-05-15 LAB
ALBUMIN SERPL ELPH-MCNC: 3.1 G/DL — LOW (ref 3.5–5)
ALP SERPL-CCNC: 110 U/L — SIGNIFICANT CHANGE UP (ref 40–120)
ALT FLD-CCNC: 13 U/L DA — SIGNIFICANT CHANGE UP (ref 10–60)
ANION GAP SERPL CALC-SCNC: 5 MMOL/L — SIGNIFICANT CHANGE UP (ref 5–17)
AST SERPL-CCNC: 18 U/L — SIGNIFICANT CHANGE UP (ref 10–40)
BASOPHILS # BLD AUTO: 0.03 K/UL — SIGNIFICANT CHANGE UP (ref 0–0.2)
BASOPHILS NFR BLD AUTO: 0.4 % — SIGNIFICANT CHANGE UP (ref 0–2)
BILIRUB SERPL-MCNC: 0.5 MG/DL — SIGNIFICANT CHANGE UP (ref 0.2–1.2)
BUN SERPL-MCNC: 19 MG/DL — HIGH (ref 7–18)
CALCIUM SERPL-MCNC: 8.7 MG/DL — SIGNIFICANT CHANGE UP (ref 8.4–10.5)
CHLORIDE SERPL-SCNC: 108 MMOL/L — SIGNIFICANT CHANGE UP (ref 96–108)
CO2 SERPL-SCNC: 28 MMOL/L — SIGNIFICANT CHANGE UP (ref 22–31)
CREAT SERPL-MCNC: 1.13 MG/DL — SIGNIFICANT CHANGE UP (ref 0.5–1.3)
CRP SERPL-MCNC: 0.19 MG/DL — SIGNIFICANT CHANGE UP (ref 0–0.4)
EOSINOPHIL # BLD AUTO: 0.1 K/UL — SIGNIFICANT CHANGE UP (ref 0–0.5)
EOSINOPHIL NFR BLD AUTO: 1.4 % — SIGNIFICANT CHANGE UP (ref 0–6)
FERRITIN SERPL-MCNC: 469 NG/ML — HIGH (ref 30–400)
GLUCOSE SERPL-MCNC: 77 MG/DL — SIGNIFICANT CHANGE UP (ref 70–99)
HCT VFR BLD CALC: 38.5 % — LOW (ref 39–50)
HGB BLD-MCNC: 12.8 G/DL — LOW (ref 13–17)
IMM GRANULOCYTES NFR BLD AUTO: 0.3 % — SIGNIFICANT CHANGE UP (ref 0–1.5)
LYMPHOCYTES # BLD AUTO: 1.93 K/UL — SIGNIFICANT CHANGE UP (ref 1–3.3)
LYMPHOCYTES # BLD AUTO: 27.7 % — SIGNIFICANT CHANGE UP (ref 13–44)
MCHC RBC-ENTMCNC: 30 PG — SIGNIFICANT CHANGE UP (ref 27–34)
MCHC RBC-ENTMCNC: 33.2 GM/DL — SIGNIFICANT CHANGE UP (ref 32–36)
MCV RBC AUTO: 90.4 FL — SIGNIFICANT CHANGE UP (ref 80–100)
MONOCYTES # BLD AUTO: 0.8 K/UL — SIGNIFICANT CHANGE UP (ref 0–0.9)
MONOCYTES NFR BLD AUTO: 11.5 % — SIGNIFICANT CHANGE UP (ref 2–14)
NEUTROPHILS # BLD AUTO: 4.1 K/UL — SIGNIFICANT CHANGE UP (ref 1.8–7.4)
NEUTROPHILS NFR BLD AUTO: 58.7 % — SIGNIFICANT CHANGE UP (ref 43–77)
NRBC # BLD: 0 /100 WBCS — SIGNIFICANT CHANGE UP (ref 0–0)
PLATELET # BLD AUTO: 200 K/UL — SIGNIFICANT CHANGE UP (ref 150–400)
POTASSIUM SERPL-MCNC: 3.7 MMOL/L — SIGNIFICANT CHANGE UP (ref 3.5–5.3)
POTASSIUM SERPL-SCNC: 3.7 MMOL/L — SIGNIFICANT CHANGE UP (ref 3.5–5.3)
PROCALCITONIN SERPL-MCNC: 0.03 NG/ML — SIGNIFICANT CHANGE UP (ref 0.02–0.1)
PROT SERPL-MCNC: 6.8 G/DL — SIGNIFICANT CHANGE UP (ref 6–8.3)
RBC # BLD: 4.26 M/UL — SIGNIFICANT CHANGE UP (ref 4.2–5.8)
RBC # FLD: 13.3 % — SIGNIFICANT CHANGE UP (ref 10.3–14.5)
SARS-COV-2 RNA SPEC QL NAA+PROBE: DETECTED
SODIUM SERPL-SCNC: 141 MMOL/L — SIGNIFICANT CHANGE UP (ref 135–145)
WBC # BLD: 6.98 K/UL — SIGNIFICANT CHANGE UP (ref 3.8–10.5)
WBC # FLD AUTO: 6.98 K/UL — SIGNIFICANT CHANGE UP (ref 3.8–10.5)

## 2020-05-15 PROCEDURE — 71045 X-RAY EXAM CHEST 1 VIEW: CPT | Mod: 26

## 2020-05-15 PROCEDURE — 99285 EMERGENCY DEPT VISIT HI MDM: CPT

## 2020-05-15 RX ORDER — FOLIC ACID 0.8 MG
1 TABLET ORAL DAILY
Refills: 0 | Status: DISCONTINUED | OUTPATIENT
Start: 2020-05-15 | End: 2020-06-08

## 2020-05-15 RX ORDER — SERTRALINE 25 MG/1
1 TABLET, FILM COATED ORAL
Qty: 0 | Refills: 0 | DISCHARGE

## 2020-05-15 RX ORDER — FUROSEMIDE 40 MG
20 TABLET ORAL DAILY
Refills: 0 | Status: DISCONTINUED | OUTPATIENT
Start: 2020-05-15 | End: 2020-06-08

## 2020-05-15 RX ORDER — ALBUTEROL 90 UG/1
2 AEROSOL, METERED ORAL EVERY 6 HOURS
Refills: 0 | Status: DISCONTINUED | OUTPATIENT
Start: 2020-05-15 | End: 2020-06-08

## 2020-05-15 RX ORDER — LEVOTHYROXINE SODIUM 125 MCG
0 TABLET ORAL
Qty: 0 | Refills: 0 | DISCHARGE

## 2020-05-15 RX ORDER — FOLIC ACID 0.8 MG
0 TABLET ORAL
Qty: 0 | Refills: 0 | DISCHARGE

## 2020-05-15 RX ORDER — SERTRALINE 25 MG/1
25 TABLET, FILM COATED ORAL DAILY
Refills: 0 | Status: DISCONTINUED | OUTPATIENT
Start: 2020-05-15 | End: 2020-06-08

## 2020-05-15 RX ORDER — ENOXAPARIN SODIUM 100 MG/ML
40 INJECTION SUBCUTANEOUS DAILY
Refills: 0 | Status: DISCONTINUED | OUTPATIENT
Start: 2020-05-15 | End: 2020-06-08

## 2020-05-15 RX ORDER — LOPERAMIDE HCL 2 MG
0 TABLET ORAL
Qty: 0 | Refills: 0 | DISCHARGE

## 2020-05-15 RX ORDER — POLYETHYLENE GLYCOL 3350 17 G/17G
17 POWDER, FOR SOLUTION ORAL DAILY
Refills: 0 | Status: DISCONTINUED | OUTPATIENT
Start: 2020-05-15 | End: 2020-06-08

## 2020-05-15 RX ORDER — ACETAMINOPHEN 500 MG
650 TABLET ORAL EVERY 6 HOURS
Refills: 0 | Status: DISCONTINUED | OUTPATIENT
Start: 2020-05-15 | End: 2020-06-08

## 2020-05-15 RX ORDER — LEVOTHYROXINE SODIUM 125 MCG
25 TABLET ORAL DAILY
Refills: 0 | Status: DISCONTINUED | OUTPATIENT
Start: 2020-05-15 | End: 2020-06-08

## 2020-05-15 RX ORDER — DOCUSATE SODIUM 100 MG
0 CAPSULE ORAL
Qty: 0 | Refills: 0 | DISCHARGE

## 2020-05-15 NOTE — PATIENT PROFILE ADULT - DISASTER - FALL HARM RISK TYPE OF ASSESSMENT
Problem: Patient Care Overview (Adult)  Goal: Plan of Care Review  Outcome: Ongoing (interventions implemented as appropriate)    11/27/17 1003 11/28/17 0234   Patient Care Overview   Progress improving --    Coping/Psychosocial Response Interventions   Plan Of Care Reviewed With --  patient       Goal: Adult Individualization and Mutuality  Outcome: Ongoing (interventions implemented as appropriate)    Problem: Pneumonia (Adult)  Goal: Signs and Symptoms of Listed Potential Problems Will be Absent or Manageable (Pneumonia)  Outcome: Ongoing (interventions implemented as appropriate)    Problem: Fall Risk (Adult)  Goal: Absence of Falls  Outcome: Ongoing (interventions implemented as appropriate)    Problem: Pressure Ulcer Risk (Kerwin Scale) (Adult,Obstetrics,Pediatric)  Goal: Skin Integrity  Outcome: Ongoing (interventions implemented as appropriate)       admission

## 2020-05-15 NOTE — ED PROVIDER NOTE - CLINICAL SUMMARY MEDICAL DECISION MAKING FREE TEXT BOX
COVID-19 positive patient. Will need to keep in the hospital in order to prevent spread at nursing home.

## 2020-05-15 NOTE — H&P ADULT - HISTORY OF PRESENT ILLNESS
84 year old male with PMHx of dementia, depression, frequent falls, and hypothyroidism and no pertinent PSHx presents to the ED from [facility] after testing positive for COVID-19 yesterday. Patient  initially presented to ED with c/o low appetite he reports that he feels generally un well, denies chest pain, shortness of breath, cough, fever, nausea, vomiting, abdominal pain or any  other complaints. He was initially sent back to [facility] however, patient is unable to maintain isolation and was sent back.     Patient had CTA chest yesterday which showed: No PE, The lungs are clear aside from a small nonspecific central ground glass opacity in the right lower lobe.

## 2020-05-15 NOTE — ED ADULT NURSE NOTE - NSIMPLEMENTINTERV_GEN_ALL_ED
Implemented All Universal Safety Interventions:  [place] to call system. Call bell, personal items and telephone within reach. Instruct patient to call for assistance. Room bathroom lighting operational. Non-slip footwear when patient is off stretcher. Physically safe environment: no spills, clutter or unnecessary equipment. Stretcher in lowest position, wheels locked, appropriate side rails in place.

## 2020-05-15 NOTE — H&P ADULT - ASSESSMENT
84 year old male with PMHx of dementia, depression, frequent falls, and hypothyroidism and no pertinent PSHx presents to the ED from [facility] after testing positive for COVID-19 yesterday. Patient is admitted for Covid 19, not requiring oxygen support.

## 2020-05-15 NOTE — ED PROVIDER NOTE - OBJECTIVE STATEMENT
84 year old male with PMHx of dementia, depression, frequent falls, and hypothyroidism and no pertinent PSHx presents to the ED from [facility] after testing positive for COVID-19 yesterday. Patient currently reports feeling generally unwell. Patient denies all other acute symptoms. NKDA. 84 year old male with PMHx of dementia, depression, frequent falls, and hypothyroidism and no pertinent PSHx presents to the ED from [facility] after testing positive for COVID-19 yesterday. Patient currently reports feeling generally unwell. Patient denies all other acute symptoms. NKDA.    pt had cta chest showing mild ground glass appearance of right.  labs no acute abnormality

## 2020-05-15 NOTE — ED ADULT NURSE NOTE - CHPI ED NUR SYMPTOMS NEG
no shortness of breath/no chest pain/no chills/no edema/no fever/no headache/no cough/no diaphoresis/no hemoptysis/no wheezing/no body aches

## 2020-05-15 NOTE — H&P ADULT - PROBLEM SELECTOR PLAN 1
-patient p/w feeling unwell, no cough or shortness of breath   -Chest X-ray: No acute pulmonary disease   -CTA chest from yesterday: No PE, small ground glass opacity in right lung   -Covid 19 PCR positive   -Patient completed Zithromax course as out-patient   -Not a candidate for Remdesivir trial, given patient has no hypoxia   -Follow inflammatory markers   -Supportive care with Robitussin and acetaminophen   -Not requiting oxygen support at this time

## 2020-05-16 LAB
ALBUMIN SERPL ELPH-MCNC: 3 G/DL — LOW (ref 3.5–5)
ALP SERPL-CCNC: 102 U/L — SIGNIFICANT CHANGE UP (ref 40–120)
ALT FLD-CCNC: 14 U/L DA — SIGNIFICANT CHANGE UP (ref 10–60)
ANION GAP SERPL CALC-SCNC: 4 MMOL/L — LOW (ref 5–17)
AST SERPL-CCNC: 16 U/L — SIGNIFICANT CHANGE UP (ref 10–40)
BASOPHILS # BLD AUTO: 0.04 K/UL — SIGNIFICANT CHANGE UP (ref 0–0.2)
BASOPHILS NFR BLD AUTO: 0.6 % — SIGNIFICANT CHANGE UP (ref 0–2)
BILIRUB SERPL-MCNC: 0.6 MG/DL — SIGNIFICANT CHANGE UP (ref 0.2–1.2)
BUN SERPL-MCNC: 17 MG/DL — SIGNIFICANT CHANGE UP (ref 7–18)
CALCIUM SERPL-MCNC: 8.9 MG/DL — SIGNIFICANT CHANGE UP (ref 8.4–10.5)
CHLORIDE SERPL-SCNC: 107 MMOL/L — SIGNIFICANT CHANGE UP (ref 96–108)
CHOLEST SERPL-MCNC: 232 MG/DL — HIGH (ref 10–199)
CO2 SERPL-SCNC: 30 MMOL/L — SIGNIFICANT CHANGE UP (ref 22–31)
CREAT SERPL-MCNC: 1.16 MG/DL — SIGNIFICANT CHANGE UP (ref 0.5–1.3)
EOSINOPHIL # BLD AUTO: 0.12 K/UL — SIGNIFICANT CHANGE UP (ref 0–0.5)
EOSINOPHIL NFR BLD AUTO: 1.9 % — SIGNIFICANT CHANGE UP (ref 0–6)
GLUCOSE SERPL-MCNC: 73 MG/DL — SIGNIFICANT CHANGE UP (ref 70–99)
HCT VFR BLD CALC: 38.6 % — LOW (ref 39–50)
HDLC SERPL-MCNC: 46 MG/DL — SIGNIFICANT CHANGE UP
HGB BLD-MCNC: 12.9 G/DL — LOW (ref 13–17)
IMM GRANULOCYTES NFR BLD AUTO: 0.3 % — SIGNIFICANT CHANGE UP (ref 0–1.5)
INR BLD: 1.03 RATIO — SIGNIFICANT CHANGE UP (ref 0.88–1.16)
LIPID PNL WITH DIRECT LDL SERPL: 151 MG/DL — SIGNIFICANT CHANGE UP
LYMPHOCYTES # BLD AUTO: 1.32 K/UL — SIGNIFICANT CHANGE UP (ref 1–3.3)
LYMPHOCYTES # BLD AUTO: 20.6 % — SIGNIFICANT CHANGE UP (ref 13–44)
MAGNESIUM SERPL-MCNC: 2.2 MG/DL — SIGNIFICANT CHANGE UP (ref 1.6–2.6)
MCHC RBC-ENTMCNC: 30 PG — SIGNIFICANT CHANGE UP (ref 27–34)
MCHC RBC-ENTMCNC: 33.4 GM/DL — SIGNIFICANT CHANGE UP (ref 32–36)
MCV RBC AUTO: 89.8 FL — SIGNIFICANT CHANGE UP (ref 80–100)
MONOCYTES # BLD AUTO: 0.7 K/UL — SIGNIFICANT CHANGE UP (ref 0–0.9)
MONOCYTES NFR BLD AUTO: 10.9 % — SIGNIFICANT CHANGE UP (ref 2–14)
NEUTROPHILS # BLD AUTO: 4.21 K/UL — SIGNIFICANT CHANGE UP (ref 1.8–7.4)
NEUTROPHILS NFR BLD AUTO: 65.7 % — SIGNIFICANT CHANGE UP (ref 43–77)
NRBC # BLD: 0 /100 WBCS — SIGNIFICANT CHANGE UP (ref 0–0)
PHOSPHATE SERPL-MCNC: 2 MG/DL — LOW (ref 2.5–4.5)
PLATELET # BLD AUTO: 195 K/UL — SIGNIFICANT CHANGE UP (ref 150–400)
POTASSIUM SERPL-MCNC: 4 MMOL/L — SIGNIFICANT CHANGE UP (ref 3.5–5.3)
POTASSIUM SERPL-SCNC: 4 MMOL/L — SIGNIFICANT CHANGE UP (ref 3.5–5.3)
PROT SERPL-MCNC: 6.6 G/DL — SIGNIFICANT CHANGE UP (ref 6–8.3)
PROTHROM AB SERPL-ACNC: 11.7 SEC — SIGNIFICANT CHANGE UP (ref 10–12.9)
RBC # BLD: 4.3 M/UL — SIGNIFICANT CHANGE UP (ref 4.2–5.8)
RBC # FLD: 13.2 % — SIGNIFICANT CHANGE UP (ref 10.3–14.5)
SODIUM SERPL-SCNC: 141 MMOL/L — SIGNIFICANT CHANGE UP (ref 135–145)
TOTAL CHOLESTEROL/HDL RATIO MEASUREMENT: 5 RATIO — SIGNIFICANT CHANGE UP (ref 3.4–9.6)
TRIGL SERPL-MCNC: 176 MG/DL — HIGH (ref 10–149)
TSH SERPL-MCNC: 3.96 UU/ML — SIGNIFICANT CHANGE UP (ref 0.34–4.82)
VIT B12 SERPL-MCNC: 844 PG/ML — SIGNIFICANT CHANGE UP (ref 232–1245)
WBC # BLD: 6.41 K/UL — SIGNIFICANT CHANGE UP (ref 3.8–10.5)
WBC # FLD AUTO: 6.41 K/UL — SIGNIFICANT CHANGE UP (ref 3.8–10.5)

## 2020-05-16 RX ORDER — SODIUM,POTASSIUM PHOSPHATES 278-250MG
1 POWDER IN PACKET (EA) ORAL
Refills: 0 | Status: COMPLETED | OUTPATIENT
Start: 2020-05-16 | End: 2020-05-17

## 2020-05-16 RX ORDER — HALOPERIDOL DECANOATE 100 MG/ML
1 INJECTION INTRAMUSCULAR ONCE
Refills: 0 | Status: DISCONTINUED | OUTPATIENT
Start: 2020-05-16 | End: 2020-05-16

## 2020-05-16 RX ADMIN — ENOXAPARIN SODIUM 40 MILLIGRAM(S): 100 INJECTION SUBCUTANEOUS at 11:38

## 2020-05-16 RX ADMIN — Medication 1 MILLIGRAM(S): at 11:38

## 2020-05-16 RX ADMIN — SERTRALINE 25 MILLIGRAM(S): 25 TABLET, FILM COATED ORAL at 11:37

## 2020-05-16 RX ADMIN — POLYETHYLENE GLYCOL 3350 17 GRAM(S): 17 POWDER, FOR SOLUTION ORAL at 11:38

## 2020-05-16 RX ADMIN — Medication 1 TABLET(S): at 17:33

## 2020-05-16 RX ADMIN — Medication 1 TABLET(S): at 11:38

## 2020-05-16 NOTE — PROGRESS NOTE ADULT - SUBJECTIVE AND OBJECTIVE BOX
SUBJECTIVE / OVERNIGHT EVENTS:pt denies chest pain, shortness of breath       MEDICATIONS  (STANDING):  enoxaparin Injectable 40 milliGRAM(s) SubCutaneous daily  folic acid 1 milliGRAM(s) Oral daily  furosemide    Tablet 20 milliGRAM(s) Oral daily  haloperidol    Injectable 1 milliGRAM(s) IntraMuscular once  levothyroxine 25 MICROGram(s) Oral daily  polyethylene glycol 3350 17 Gram(s) Oral daily  potassium acid phosphate/sodium acid phosphate tablet (K-PHOS No. 2) 1 Tablet(s) Oral four times a day with meals  sertraline 25 milliGRAM(s) Oral daily    MEDICATIONS  (PRN):  acetaminophen   Tablet .. 650 milliGRAM(s) Oral every 6 hours PRN Temp greater or equal to 38C (100.4F), Mild Pain (1 - 3)  ALBUTerol    90 MICROgram(s) HFA Inhaler 2 Puff(s) Inhalation every 6 hours PRN Shortness of Breath and/or Wheezing  guaiFENesin   Syrup  (Sugar-Free) 100 milliGRAM(s) Oral every 6 hours PRN Cough    Vital Signs Last 24 Hrs  T(C): 36.6 (16 May 2020 13:00), Max: 36.8 (16 May 2020 05:30)  T(F): 97.9 (16 May 2020 13:00), Max: 98.2 (16 May 2020 05:30)  HR: 55 (16 May 2020 13:00) (55 - 98)  BP: 120/59 (16 May 2020 13:00) (120/59 - 140/65)  BP(mean): --  RR: 18 (16 May 2020 13:00) (16 - 18)  SpO2: 100% (16 May 2020 13:00) (98% - 100%)    CAPILLARY BLOOD GLUCOSE        I&O's Summary      Constitutional: No fever, fatigue  Skin: No rash.  Eyes: No recent vision problems or eye pain.  ENT: No congestion, ear pain, or sore throat.  Cardiovascular: No chest pain or palpation.  Respiratory: No cough, shortness of breath, congestion, or wheezing.  Gastrointestinal: No abdominal pain, nausea, vomiting, or diarrhea.  Genitourinary: No dysuria.  Musculoskeletal: No joint swelling.  Neurologic: No headache.    PHYSICAL EXAM:  GENERAL: NAD  EYES: EOMI, PERRLA  NECK: Supple, No JVD  CHEST/LUNG: dec breath sounds at bases   HEART:  S1 , S2 +  ABDOMEN: soft , bs+  EXTREMITIES:  no edema  NEUROLOGY:alert awake follows commands      LABS:                        12.9   6.41  )-----------( 195      ( 16 May 2020 08:27 )             38.6     05-16    141  |  107  |  17  ----------------------------<  73  4.0   |  30  |  1.16    Ca    8.9      16 May 2020 08:27  Phos  2.0     05-16  Mg     2.2     05-16    TPro  6.6  /  Alb  3.0<L>  /  TBili  0.6  /  DBili  x   /  AST  16  /  ALT  14  /  AlkPhos  102  05-16    PT/INR - ( 16 May 2020 08:27 )   PT: 11.7 sec;   INR: 1.03 ratio                   RADIOLOGY & ADDITIONAL TESTS:    Imaging Personally Reviewed:    Consultant(s) Notes Reviewed:      Care Discussed with Consultants/Other Providers:

## 2020-05-16 NOTE — CHART NOTE - NSCHARTNOTEFT_GEN_A_CORE
EVENT: Pt screaming, cursing, accusing staff of kidnapping him, poor safety awareness attempting to get OOB. Hx of falls.    BRIEF HPI: 4 year old male with PMHx of dementia, depression, frequent falls, and hypothyroidism and no pertinent PSHx presents to the ED from [facility] after testing positive for COVID-19 yesterday. Patient  initially presented to ED with c/o low appetite he reports that he feels generally un well, denies chest pain, shortness of breath, cough, fever, nausea, vomiting, abdominal pain or any  other complaints. He was initially sent back to [facility] however, patient is unable to maintain isolation and was sent back.   Patient had CTA chest which showed: No PE, The lungs are clear aside from a small nonspecific central ground glass opacity in the right lower lobe. Admitted for Covid 19 management, now agitated, shouting.    Vital Signs Last 24 Hrs  T(C): 36.8 (16 May 2020 05:30), Max: 37.2 (15 May 2020 19:43)  T(F): 98.2 (16 May 2020 05:30), Max: 99 (15 May 2020 19:43)  HR: 98 (16 May 2020 05:30) (59 - 98)  BP: 140/60 (16 May 2020 05:30) (121/71 - 140/65)  BP(mean): --  RR: 16 (16 May 2020 05:30) (16 - 18)  SpO2: 98% (16 May 2020 05:30) (96% - 99%)    05-15    141  |  108  |  19<H>  ----------------------------<  77  3.7   |  28  |  1.13    Ca    8.7      15 May 2020 15:14  Mg     2.2     05-14    TPro  6.8  /  Alb  3.1<L>  /  TBili  0.5  /  DBili  x   /  AST  18  /  ALT  13  /  AlkPhos  110  05-15                          12.8   6.98  )-----------( 200      ( 15 May 2020 15:14 )             38.5     PROBLEM: Agitation probably related to dementia  PLAN:   1. Constant observation initiated EVENT: Pt screaming, cursing, accusing staff of kidnapping him, poor safety awareness attempting to get OOB. Hx of falls.    BRIEF HPI: 84 year old male with PMHx of dementia, depression, frequent falls, and hypothyroidism and no pertinent PSHx presents to the ED from [facility] after testing positive for COVID-19 yesterday. Patient  initially presented to ED with c/o low appetite he reports that he feels generally un well, denies chest pain, shortness of breath, cough, fever, nausea, vomiting, abdominal pain or any  other complaints. He was initially sent back to [facility] however, patient is unable to maintain isolation and was sent back.   Patient had CTA chest which showed: No PE, The lungs are clear aside from a small nonspecific central ground glass opacity in the right lower lobe. Admitted on medicine for Covid 19 management, now agitated, shouting.    Vital Signs Last 24 Hrs  T(C): 36.8 (16 May 2020 05:30), Max: 37.2 (15 May 2020 19:43)  T(F): 98.2 (16 May 2020 05:30), Max: 99 (15 May 2020 19:43)  HR: 98 (16 May 2020 05:30) (59 - 98)  BP: 140/60 (16 May 2020 05:30) (121/71 - 140/65)  BP(mean): --  RR: 16 (16 May 2020 05:30) (16 - 18)  SpO2: 98% (16 May 2020 05:30) (96% - 99%)    05-15    141  |  108  |  19<H>  ----------------------------<  77  3.7   |  28  |  1.13    Ca    8.7      15 May 2020 15:14  Mg     2.2     05-14    TPro  6.8  /  Alb  3.1<L>  /  TBili  0.5  /  DBili  x   /  AST  18  /  ALT  13  /  AlkPhos  110  05-15                          12.8   6.98  )-----------( 200      ( 15 May 2020 15:14 )             38.5     PROBLEM: Agitation probably related to dementia  PLAN:   1. Constant observation ordered  2. Haloperidol Injectable 1 ivon GRAM(s) Intramuscular once ordered  3. Cont sertraline 25 ivon GRAM(s) Oral daily

## 2020-05-17 LAB
ANION GAP SERPL CALC-SCNC: 7 MMOL/L — SIGNIFICANT CHANGE UP (ref 5–17)
BUN SERPL-MCNC: 21 MG/DL — HIGH (ref 7–18)
CALCIUM SERPL-MCNC: 9 MG/DL — SIGNIFICANT CHANGE UP (ref 8.4–10.5)
CHLORIDE SERPL-SCNC: 108 MMOL/L — SIGNIFICANT CHANGE UP (ref 96–108)
CO2 SERPL-SCNC: 28 MMOL/L — SIGNIFICANT CHANGE UP (ref 22–31)
CREAT SERPL-MCNC: 1.15 MG/DL — SIGNIFICANT CHANGE UP (ref 0.5–1.3)
GLUCOSE SERPL-MCNC: 74 MG/DL — SIGNIFICANT CHANGE UP (ref 70–99)
HCT VFR BLD CALC: 37.8 % — LOW (ref 39–50)
HGB BLD-MCNC: 12.5 G/DL — LOW (ref 13–17)
MAGNESIUM SERPL-MCNC: 2.4 MG/DL — SIGNIFICANT CHANGE UP (ref 1.6–2.6)
MCHC RBC-ENTMCNC: 30 PG — SIGNIFICANT CHANGE UP (ref 27–34)
MCHC RBC-ENTMCNC: 33.1 GM/DL — SIGNIFICANT CHANGE UP (ref 32–36)
MCV RBC AUTO: 90.6 FL — SIGNIFICANT CHANGE UP (ref 80–100)
NRBC # BLD: 0 /100 WBCS — SIGNIFICANT CHANGE UP (ref 0–0)
PHOSPHATE SERPL-MCNC: 2.9 MG/DL — SIGNIFICANT CHANGE UP (ref 2.5–4.5)
PLATELET # BLD AUTO: 174 K/UL — SIGNIFICANT CHANGE UP (ref 150–400)
POTASSIUM SERPL-MCNC: 3.8 MMOL/L — SIGNIFICANT CHANGE UP (ref 3.5–5.3)
POTASSIUM SERPL-SCNC: 3.8 MMOL/L — SIGNIFICANT CHANGE UP (ref 3.5–5.3)
RBC # BLD: 4.17 M/UL — LOW (ref 4.2–5.8)
RBC # FLD: 13.3 % — SIGNIFICANT CHANGE UP (ref 10.3–14.5)
SODIUM SERPL-SCNC: 143 MMOL/L — SIGNIFICANT CHANGE UP (ref 135–145)
WBC # BLD: 6.02 K/UL — SIGNIFICANT CHANGE UP (ref 3.8–10.5)
WBC # FLD AUTO: 6.02 K/UL — SIGNIFICANT CHANGE UP (ref 3.8–10.5)

## 2020-05-17 RX ADMIN — POLYETHYLENE GLYCOL 3350 17 GRAM(S): 17 POWDER, FOR SOLUTION ORAL at 11:56

## 2020-05-17 RX ADMIN — Medication 1 MILLIGRAM(S): at 11:56

## 2020-05-17 RX ADMIN — ENOXAPARIN SODIUM 40 MILLIGRAM(S): 100 INJECTION SUBCUTANEOUS at 11:56

## 2020-05-17 RX ADMIN — SERTRALINE 25 MILLIGRAM(S): 25 TABLET, FILM COATED ORAL at 11:56

## 2020-05-17 RX ADMIN — Medication 1 TABLET(S): at 08:29

## 2020-05-17 NOTE — PROGRESS NOTE ADULT - SUBJECTIVE AND OBJECTIVE BOX
SUBJECTIVE / OVERNIGHT EVENTS: pt denies chest pain, shortness of breath       MEDICATIONS  (STANDING):  enoxaparin Injectable 40 milliGRAM(s) SubCutaneous daily  folic acid 1 milliGRAM(s) Oral daily  furosemide    Tablet 20 milliGRAM(s) Oral daily  levothyroxine 25 MICROGram(s) Oral daily  polyethylene glycol 3350 17 Gram(s) Oral daily  sertraline 25 milliGRAM(s) Oral daily    MEDICATIONS  (PRN):  acetaminophen   Tablet .. 650 milliGRAM(s) Oral every 6 hours PRN Temp greater or equal to 38C (100.4F), Mild Pain (1 - 3)  ALBUTerol    90 MICROgram(s) HFA Inhaler 2 Puff(s) Inhalation every 6 hours PRN Shortness of Breath and/or Wheezing  guaiFENesin   Syrup  (Sugar-Free) 100 milliGRAM(s) Oral every 6 hours PRN Cough    Vital Signs Last 24 Hrs  T(C): 36.6 (17 May 2020 20:54), Max: 36.9 (17 May 2020 05:45)  T(F): 97.8 (17 May 2020 20:54), Max: 98.5 (17 May 2020 05:45)  HR: 54 (17 May 2020 20:54) (50 - 54)  BP: 144/71 (17 May 2020 20:54) (134/62 - 144/71)  BP(mean): --  RR: 18 (17 May 2020 20:54) (17 - 18)  SpO2: 99% (17 May 2020 20:54) (99% - 100%)    CAPILLARY BLOOD GLUCOSE        I&O's Summary      Constitutional: No fever, fatigue  Skin: No rash.  Eyes: No recent vision problems or eye pain.  ENT: No congestion, ear pain, or sore throat.  Cardiovascular: No chest pain or palpation.  Respiratory: No cough, shortness of breath, congestion, or wheezing.  Gastrointestinal: No abdominal pain, nausea, vomiting, or diarrhea.  Genitourinary: No dysuria.  Musculoskeletal: No joint swelling.  Neurologic: No headache.    PHYSICAL EXAM:  GENERAL: NAD  EYES: EOMI, PERRLA  NECK: Supple, No JVD  CHEST/LUNG: dec breath sounds at bases   HEART:  S1 , S2 +  ABDOMEN: soft , bs+  EXTREMITIES:no edema    NEUROLOGY: alert awake oriented to place      LABS:                        12.5   6.02  )-----------( 174      ( 17 May 2020 07:31 )             37.8     05-17    143  |  108  |  21<H>  ----------------------------<  74  3.8   |  28  |  1.15    Ca    9.0      17 May 2020 07:31  Phos  2.9     05-17  Mg     2.4     05-17    TPro  6.6  /  Alb  3.0<L>  /  TBili  0.6  /  DBili  x   /  AST  16  /  ALT  14  /  AlkPhos  102  05-16    PT/INR - ( 16 May 2020 08:27 )   PT: 11.7 sec;   INR: 1.03 ratio                   RADIOLOGY & ADDITIONAL TESTS:    Imaging Personally Reviewed:    Consultant(s) Notes Reviewed:      Care Discussed with Consultants/Other Providers:

## 2020-05-18 DIAGNOSIS — Z02.9 ENCOUNTER FOR ADMINISTRATIVE EXAMINATIONS, UNSPECIFIED: ICD-10-CM

## 2020-05-18 LAB
ANION GAP SERPL CALC-SCNC: 3 MMOL/L — LOW (ref 5–17)
BUN SERPL-MCNC: 19 MG/DL — HIGH (ref 7–18)
CALCIUM SERPL-MCNC: 8.5 MG/DL — SIGNIFICANT CHANGE UP (ref 8.4–10.5)
CHLORIDE SERPL-SCNC: 110 MMOL/L — HIGH (ref 96–108)
CO2 SERPL-SCNC: 28 MMOL/L — SIGNIFICANT CHANGE UP (ref 22–31)
CREAT SERPL-MCNC: 1 MG/DL — SIGNIFICANT CHANGE UP (ref 0.5–1.3)
GLUCOSE SERPL-MCNC: 63 MG/DL — LOW (ref 70–99)
HCT VFR BLD CALC: 40.3 % — SIGNIFICANT CHANGE UP (ref 39–50)
HGB BLD-MCNC: 13.1 G/DL — SIGNIFICANT CHANGE UP (ref 13–17)
MCHC RBC-ENTMCNC: 29.2 PG — SIGNIFICANT CHANGE UP (ref 27–34)
MCHC RBC-ENTMCNC: 32.5 GM/DL — SIGNIFICANT CHANGE UP (ref 32–36)
MCV RBC AUTO: 90 FL — SIGNIFICANT CHANGE UP (ref 80–100)
MRSA PCR RESULT.: SIGNIFICANT CHANGE UP
NRBC # BLD: 0 /100 WBCS — SIGNIFICANT CHANGE UP (ref 0–0)
PLATELET # BLD AUTO: 187 K/UL — SIGNIFICANT CHANGE UP (ref 150–400)
POTASSIUM SERPL-MCNC: 3.8 MMOL/L — SIGNIFICANT CHANGE UP (ref 3.5–5.3)
POTASSIUM SERPL-SCNC: 3.8 MMOL/L — SIGNIFICANT CHANGE UP (ref 3.5–5.3)
RBC # BLD: 4.48 M/UL — SIGNIFICANT CHANGE UP (ref 4.2–5.8)
RBC # FLD: 13.4 % — SIGNIFICANT CHANGE UP (ref 10.3–14.5)
S AUREUS DNA NOSE QL NAA+PROBE: SIGNIFICANT CHANGE UP
SARS-COV-2 RNA SPEC QL NAA+PROBE: DETECTED
SODIUM SERPL-SCNC: 141 MMOL/L — SIGNIFICANT CHANGE UP (ref 135–145)
WBC # BLD: 6.86 K/UL — SIGNIFICANT CHANGE UP (ref 3.8–10.5)
WBC # FLD AUTO: 6.86 K/UL — SIGNIFICANT CHANGE UP (ref 3.8–10.5)

## 2020-05-18 RX ADMIN — Medication 1 MILLIGRAM(S): at 11:08

## 2020-05-18 RX ADMIN — ENOXAPARIN SODIUM 40 MILLIGRAM(S): 100 INJECTION SUBCUTANEOUS at 11:08

## 2020-05-18 RX ADMIN — POLYETHYLENE GLYCOL 3350 17 GRAM(S): 17 POWDER, FOR SOLUTION ORAL at 11:08

## 2020-05-18 RX ADMIN — SERTRALINE 25 MILLIGRAM(S): 25 TABLET, FILM COATED ORAL at 11:08

## 2020-05-18 NOTE — PROGRESS NOTE ADULT - SUBJECTIVE AND OBJECTIVE BOX
NP Note discussed with  Primary Attending    Patient is a 84y old  Male who presents with a chief complaint of Covid + (18 May 2020 00:16)      INTERVAL HPI/OVERNIGHT EVENTS: no new complaints    MEDICATIONS  (STANDING):  enoxaparin Injectable 40 milliGRAM(s) SubCutaneous daily  folic acid 1 milliGRAM(s) Oral daily  furosemide    Tablet 20 milliGRAM(s) Oral daily  levothyroxine 25 MICROGram(s) Oral daily  polyethylene glycol 3350 17 Gram(s) Oral daily  sertraline 25 milliGRAM(s) Oral daily    MEDICATIONS  (PRN):  acetaminophen   Tablet .. 650 milliGRAM(s) Oral every 6 hours PRN Temp greater or equal to 38C (100.4F), Mild Pain (1 - 3)  ALBUTerol    90 MICROgram(s) HFA Inhaler 2 Puff(s) Inhalation every 6 hours PRN Shortness of Breath and/or Wheezing  guaiFENesin   Syrup  (Sugar-Free) 100 milliGRAM(s) Oral every 6 hours PRN Cough      __________________________________________________  REVIEW OF SYSTEMS:  Pt. is confused, unable to assess    Vital Signs Last 24 Hrs  T(C): 36.4 (18 May 2020 14:57), Max: 36.6 (17 May 2020 20:54)  T(F): 97.6 (18 May 2020 14:57), Max: 97.9 (18 May 2020 04:50)  HR: 79 (18 May 2020 14:57) (48 - 79)  BP: 100/65 (18 May 2020 14:57) (100/65 - 144/71)  BP(mean): --  RR: 16 (18 May 2020 14:57) (16 - 18)  SpO2: 100% (18 May 2020 14:57) (99% - 100%)    ________________________________________________  PHYSICAL EXAM:  GENERAL: NAD  HEENT: Normocephalic;  conjunctivae and sclerae clear; moist mucous membranes;   NECK : supple  CHEST/LUNG: Clear to auscultation bilaterally with good air entry   HEART: S1 S2  regular; no murmurs, gallops or rubs  ABDOMEN: Soft, Nontender, Nondistended; Bowel sounds present  EXTREMITIES: no cyanosis; no edema; no calf tenderness  SKIN: warm and dry; no rash  NERVOUS SYSTEM:  Awake and alert confused    _________________________________________________  LABS:                        13.1   6.86  )-----------( 187      ( 18 May 2020 08:29 )             40.3     05-18    141  |  110<H>  |  19<H>  ----------------------------<  63<L>  3.8   |  28  |  1.00    Ca    8.5      18 May 2020 08:28  Phos  2.9     05-17  Mg     2.4     05-17          CAPILLARY BLOOD GLUCOSE    RADIOLOGY & ADDITIONAL TESTS:    Xray Chest 1 View- PORTABLE-Urgent (05.15.20 @ 15:26) >  EXAM:  XR CHEST PORTABLE URGENT 1V                            PROCEDURE DATE:  05/15/2020          INTERPRETATION:    Examination: XR CHEST URGENT    History: ADMDIAG1: U07.1 COVID-19 ACUTE RESPIRATORY DISEASE/, covid +    Comparison: 5/14/2020 and3/22/2019    Findings: Normal heart size. Clear lungs. No pneumothorax or pleural effusion. Air over the upper abdomen is likely in the patient's high positioned transverse colon.      Impression:  1.  No active pulmonary disease.      DISCRETE X-RAYDATA:  Percent of LEFT lung opacification: Clear  Percent of RIGHT lung opacification: Clear  Change in lung opacification from most recent x-ray (<=3 days): Stable  Change from prior dated 3 or more days (same admission): No Prior    < end of copied text >    CT Angio Chest w/ IV Cont (05.14.20 @ 19:57) >  EXAM:  CT ANGIO CHEST (W)AW IC                            PROCEDURE DATE:  05/14/2020          INTERPRETATION:  CT ANGIO CHEST WITHOUT AND OR WITH IV CONTRAST    CLINICAL INFORMATION:  elevated d-dimer    TECHNIQUE: Contrast enhanced CT pulmonary angiogram was performed. Multiplanar CT and HRCT images were reviewed. Maximum intensity projection (MIP) images are reconstructed as per CT angiography protocol. Images were acquired during the administration of 46 cc Omnipaque 350 IV contrast. This study was performed using automatic exposure control (radiation dose reduction software) to obtain a diagnostic image quality scan with patient dose as low as reasonably achievable.    COMPARISON: Same day chest x-ray    PULMONARY ARTERIES: No pulmonary embolism.    LUNGS, AIRWAYS: The central airways are patent. The lungs are clear aside from a small nonspecific central groundglass opacity in the right lower lobe.    PLEURA: No pleural abnormality.    VESSELS: Normal caliber aorta.     HEART: Normal heart size. No pericardial effusion. Coronary artery calcifications are present.    MEDIASTINUM, LEE, AXILLAE: No adenopathy.    UPPER ABDOMEN: Limited visualization is unremarkable.    BONES AND CHEST WALL: No acute bony abnormality.    IMPRESSION:     No pulmonary embolism.    The lungs are clear aside from a small nonspecific central groundglass opacity in the right lower lobe.     < end of copied text >          Imaging Personally Reviewed:  YES/NO    Consultant(s) Notes Reviewed:   YES/ No    Care Discussed with Consultants :     Plan of care was discussed with patient and /or primary care giver; all questions and concerns were addressed and care was aligned with patient's wishes.

## 2020-05-18 NOTE — DISCHARGE NOTE PROVIDER - NSDCMRMEDTOKEN_GEN_ALL_CORE_FT
folic acid 1 mg oral tablet: 1 tab(s) orally once a day  furosemide 20 mg oral tablet: 1 tab(s) orally once a day  levothyroxine 25 mcg (0.025 mg) oral tablet: 1 tab(s) orally once a day  polyethylene glycol 3350 oral powder for reconstitution:   sertraline 25 mg oral tablet: 2.5 tab(s) orally once a day

## 2020-05-18 NOTE — DISCHARGE NOTE PROVIDER - NSDCCPCAREPLAN_GEN_ALL_CORE_FT
PRINCIPAL DISCHARGE DIAGNOSIS  Diagnosis: COVID-19 virus detected  Assessment and Plan of Treatment: You were tested positive for COVID 19  You were asymptomtatic during this hospitalization  read this home care instruction carefully  CORONAVIRUS INSTRUCTIONS:   Based on your current clinical status and stability, it has been determined that you no longer need hospitalization and can recover while remaining in self-quarantine at home. You should follow the prevention steps below until a healthcare provider or local or state health department says you can return to your normal activities.   1. You should restrict activities outside your home, except for getting medical care.   2. Do not go to work, school, or public areas.   3. Avoid using public transportation, ride-sharing, or taxis.   4. Separate yourself from other people and animals in your home as much as possible.  When you are around other people (e.g., sharing a room or vehicle) you should wear a facemask.  5. Wash your hands often with soap and water for at least 20 seconds, especially after blowing your nose, coughing, or sneezing; going to the bathroom; and before eating or preparing food.  6. Cover your mouth and nose with a tissue when you cough or sneeze. Throw used tissues in a lined trash can. Immediately wash your hands with soap and water for at least 20 seconds  7. High touch surfaces include counters, tabletops, doorknobs, bathroom fixtures, toilets, phones, keyboards, tablets, and bedside tables.  8. Avoid sharing dishes, drinking glasses, cups, eating utensils, towels, or bedding with other people or pets in your home. After using these items, they should be washed thoroughly with soap and water.  You are strongly advised to seek prompt medical attention if your illness worsens or you develop new symptoms like fever or difficulty breathing. PRINCIPAL DISCHARGE DIAGNOSIS  Diagnosis: COVID-19 virus detected  Assessment and Plan of Treatment: You were tested positive for COVID 19  You were asymptomtatic during this hospitalization  read this home care instruction carefully  CORONAVIRUS INSTRUCTIONS:   Based on your current clinical status and stability, it has been determined that you no longer need hospitalization and can recover while remaining in self-quarantine at home. You should follow the prevention steps below until a healthcare provider or local or state health department says you can return to your normal activities.   1. You should restrict activities outside your home, except for getting medical care.   2. Do not go to work, school, or public areas.   3. Avoid using public transportation, ride-sharing, or taxis.   4. Separate yourself from other people and animals in your home as much as possible.  When you are around other people (e.g., sharing a room or vehicle) you should wear a facemask.  5. Wash your hands often with soap and water for at least 20 seconds, especially after blowing your nose, coughing, or sneezing; going to the bathroom; and before eating or preparing food.  6. Cover your mouth and nose with a tissue when you cough or sneeze. Throw used tissues in a lined trash can. Immediately wash your hands with soap and water for at least 20 seconds  7. High touch surfaces include counters, tabletops, doorknobs, bathroom fixtures, toilets, phones, keyboards, tablets, and bedside tables.  8. Avoid sharing dishes, drinking glasses, cups, eating utensils, towels, or bedding with other people or pets in your home. After using these items, they should be washed thoroughly with soap and water.  You are strongly advised to seek prompt medical attention if your illness worsens or you develop new symptoms like fever or difficulty breathing.      SECONDARY DISCHARGE DIAGNOSES  Diagnosis: Hypothyroid  Assessment and Plan of Treatment: Hypothyroid Continue synthroid as ordered. Monitor TSH as an outpatient.    Diagnosis: Dementia  Assessment and Plan of Treatment: Dementia  Maintain fall and safety measures.    Diagnosis: Mild protein-calorie malnutrition  Assessment and Plan of Treatment: Mild protein-calorie malnutrition  Continue Ensure 2 times a day.

## 2020-05-18 NOTE — DISCHARGE NOTE PROVIDER - HOSPITAL COURSE
84 year old male with PMHx of dementia, depression, frequent falls, and hypothyroidism and no pertinent PSHx presents to the ED from [facility] [place] after testing positive for COVID-19. Initially sent back to [facility] however, patient is unable to maintain isolation and was sent back to . Patient is admitted again for COVID-19, not requiring oxygen support. CTA chest: No PE, small ground glass opacity in right lung, completed Zithromax course as out-patient.    Not a candidate for Remdesivir trial, given patient has no hypoxia      COVID-19 neg x2 ? discharged to ?                                   <<<<<<<<<<<<<<<< INCOMPLETE>>>>>>>>>>>>>>        If you have COVID-19: Healthcare providers will give you specific instructions to follow. The following are general guidelines to remind you of how to keep others safe until you are well:         Limit close contact with others. Your healthcare provider will tell you when it is okay to be around others. This may be when you do not have a fever, do not take fever medicine, and have no symptoms. Fluid from your respiratory tract will need to test negative for the virus 2 times at least 24 hours apart. Until then, do the following along with any instructions from your provider:     Only go out of the house for medical appointments. Always call the provider’s office first so he or she can prepare to keep others safe.        Stay at least 6 feet (2 meters) away from others.        Sleep in a different room from others in the house.        Do not shake hands with other people.        Wear a medical mask when others are near you. This can help prevent droplets from spreading the virus when you talk, sneeze, or cough.        Do not share items. Do not share dishes, towels, or other items with anyone. Items need to be washed after you use them.        Do not handle live animals. The virus may be spread to animals, including pets. Until more is known, it is best not to touch, play with, or handle live animals. 84 year old male with PMHx of dementia, depression, frequent falls, and hypothyroidism and no pertinent PSHx presents to the ED from [facility] [place] after testing positive for COVID-19. Initially sent back to [facility] however, patient is unable to maintain isolation and was sent back to . Patient is admitted again for COVID-19, not requiring oxygen support. CTA chest: No PE, small ground glass opacity in right lung, completed Zithromax course as out-patient.    Not a candidate for Remdesivir trial, given patient has no hypoxia      COVID-19 neg x2 6/4 & 6/5 discharged to [facility]        Discussed with attending.        Patient medically stable for discharge.        For full hospital course, please see medical record.            If you have COVID-19: Healthcare providers will give you specific instructions to follow. The following are general guidelines to remind you of how to keep others safe until you are well:         Limit close contact with others. Your healthcare provider will tell you when it is okay to be around others. This may be when you do not have a fever, do not take fever medicine, and have no symptoms. Fluid from your respiratory tract will need to test negative for the virus 2 times at least 24 hours apart. Until then, do the following along with any instructions from your provider:     Only go out of the house for medical appointments. Always call the provider’s office first so he or she can prepare to keep others safe.        Stay at least 6 feet (2 meters) away from others.        Sleep in a different room from others in the house.        Do not shake hands with other people.        Wear a medical mask when others are near you. This can help prevent droplets from spreading the virus when you talk, sneeze, or cough.        Do not share items. Do not share dishes, towels, or other items with anyone. Items need to be washed after you use them.        Do not handle live animals. The virus may be spread to animals, including pets. Until more is known, it is best not to touch, play with, or handle live animals. 84 year old male with PMHx of dementia, depression, frequent falls, and hypothyroidism and no pertinent PSHx presents to the ED from [facility] [place] after testing positive for COVID-19. Initially sent back to [facility] however, patient is unable to maintain isolation and was sent back to . Patient is admitted again for COVID-19, not requiring oxygen support. CTA chest: No PE, small ground glass opacity in right lung, completed Zithromax course as out-patient.    Not a candidate for Remdesivir trial, given patient has no hypoxia      COVID-19 neg x2 6/4 & 6/5 discharged to [facility]    LABS:                            12.6     6.52  )-----------( 194      ( 07 Jun 2020 08:47 )               38.9         06-08        139  |  105  |  28<H>    ----------------------------<  77    3.7   |  26  |  0.99        Ca    9.0      08 Jun 2020 07:19        Patient medically stable for discharge.        For full hospital course, please see medical record.            If you have COVID-19: Healthcare providers will give you specific instructions to follow. The following are general guidelines to remind you of how to keep others safe until you are well:         Limit close contact with others. Your healthcare provider will tell you when it is okay to be around others. This may be when you do not have a fever, do not take fever medicine, and have no symptoms. Fluid from your respiratory tract will need to test negative for the virus 2 times at least 24 hours apart. Until then, do the following along with any instructions from your provider:     Only go out of the house for medical appointments. Always call the provider’s office first so he or she can prepare to keep others safe.        Stay at least 6 feet (2 meters) away from others.        Sleep in a different room from others in the house.        Do not shake hands with other people.        Wear a medical mask when others are near you. This can help prevent droplets from spreading the virus when you talk, sneeze, or cough.        Do not share items. Do not share dishes, towels, or other items with anyone. Items need to be washed after you use them.        Do not handle live animals. The virus may be spread to animals, including pets. Until more is known, it is best not to touch, play with, or handle live animals.

## 2020-05-18 NOTE — DISCHARGE NOTE PROVIDER - CARE PROVIDER_API CALL
Gary Villalta  [address] [address]  Phone: [phone]  Fax: [phone]  Follow Up Time: 1 week

## 2020-05-19 LAB
ANION GAP SERPL CALC-SCNC: 6 MMOL/L — SIGNIFICANT CHANGE UP (ref 5–17)
BUN SERPL-MCNC: 24 MG/DL — HIGH (ref 7–18)
CALCIUM SERPL-MCNC: 9.1 MG/DL — SIGNIFICANT CHANGE UP (ref 8.4–10.5)
CHLORIDE SERPL-SCNC: 109 MMOL/L — HIGH (ref 96–108)
CO2 SERPL-SCNC: 28 MMOL/L — SIGNIFICANT CHANGE UP (ref 22–31)
CREAT SERPL-MCNC: 1.03 MG/DL — SIGNIFICANT CHANGE UP (ref 0.5–1.3)
CULTURE RESULTS: SIGNIFICANT CHANGE UP
CULTURE RESULTS: SIGNIFICANT CHANGE UP
GLUCOSE SERPL-MCNC: 75 MG/DL — SIGNIFICANT CHANGE UP (ref 70–99)
HCT VFR BLD CALC: 38.4 % — LOW (ref 39–50)
HGB BLD-MCNC: 12.7 G/DL — LOW (ref 13–17)
MCHC RBC-ENTMCNC: 30.2 PG — SIGNIFICANT CHANGE UP (ref 27–34)
MCHC RBC-ENTMCNC: 33.1 GM/DL — SIGNIFICANT CHANGE UP (ref 32–36)
MCV RBC AUTO: 91.2 FL — SIGNIFICANT CHANGE UP (ref 80–100)
NRBC # BLD: 0 /100 WBCS — SIGNIFICANT CHANGE UP (ref 0–0)
PLATELET # BLD AUTO: 162 K/UL — SIGNIFICANT CHANGE UP (ref 150–400)
POTASSIUM SERPL-MCNC: 3.7 MMOL/L — SIGNIFICANT CHANGE UP (ref 3.5–5.3)
POTASSIUM SERPL-SCNC: 3.7 MMOL/L — SIGNIFICANT CHANGE UP (ref 3.5–5.3)
RBC # BLD: 4.21 M/UL — SIGNIFICANT CHANGE UP (ref 4.2–5.8)
RBC # FLD: 13.4 % — SIGNIFICANT CHANGE UP (ref 10.3–14.5)
SODIUM SERPL-SCNC: 143 MMOL/L — SIGNIFICANT CHANGE UP (ref 135–145)
SPECIMEN SOURCE: SIGNIFICANT CHANGE UP
SPECIMEN SOURCE: SIGNIFICANT CHANGE UP
WBC # BLD: 6.63 K/UL — SIGNIFICANT CHANGE UP (ref 3.8–10.5)
WBC # FLD AUTO: 6.63 K/UL — SIGNIFICANT CHANGE UP (ref 3.8–10.5)

## 2020-05-19 RX ADMIN — Medication 1 MILLIGRAM(S): at 11:12

## 2020-05-19 RX ADMIN — POLYETHYLENE GLYCOL 3350 17 GRAM(S): 17 POWDER, FOR SOLUTION ORAL at 11:12

## 2020-05-19 RX ADMIN — ENOXAPARIN SODIUM 40 MILLIGRAM(S): 100 INJECTION SUBCUTANEOUS at 11:12

## 2020-05-19 RX ADMIN — SERTRALINE 25 MILLIGRAM(S): 25 TABLET, FILM COATED ORAL at 11:12

## 2020-05-19 NOTE — PROGRESS NOTE ADULT - SUBJECTIVE AND OBJECTIVE BOX
NP Note discussed with  Primary Attending    Patient is a 84y old  Male who presents with a chief complaint of Covid + (18 May 2020 17:56)      INTERVAL HPI/OVERNIGHT EVENTS: no new complaints    MEDICATIONS  (STANDING):  enoxaparin Injectable 40 milliGRAM(s) SubCutaneous daily  folic acid 1 milliGRAM(s) Oral daily  furosemide    Tablet 20 milliGRAM(s) Oral daily  levothyroxine 25 MICROGram(s) Oral daily  polyethylene glycol 3350 17 Gram(s) Oral daily  sertraline 25 milliGRAM(s) Oral daily    MEDICATIONS  (PRN):  acetaminophen   Tablet .. 650 milliGRAM(s) Oral every 6 hours PRN Temp greater or equal to 38C (100.4F), Mild Pain (1 - 3)  ALBUTerol    90 MICROgram(s) HFA Inhaler 2 Puff(s) Inhalation every 6 hours PRN Shortness of Breath and/or Wheezing  guaiFENesin   Syrup  (Sugar-Free) 100 milliGRAM(s) Oral every 6 hours PRN Cough      __________________________________________________  REVIEW OF SYSTEMS:     CONSTITUTIONAL: No fever,   EYES: no acute visual disturbances  NECK: No pain or stiffness  RESPIRATORY: No cough; No shortness of breath  CARDIOVASCULAR: No chest pain, no palpitations  GASTROINTESTINAL: No pain. No nausea or vomiting; No diarrhea   NEUROLOGICAL: No headache or numbness, no tremors  MUSCULOSKELETAL: No joint pain, no muscle pain  GENITOURINARY: no dysuria, no frequency, no hesitancy  PSYCHIATRY: no depression , no anxiety  ALL OTHER  ROS negative        Vital Signs Last 24 Hrs  T(C): 36.6 (19 May 2020 12:55), Max: 36.8 (18 May 2020 20:35)  T(F): 97.9 (19 May 2020 12:55), Max: 98.2 (18 May 2020 20:35)  HR: 65 (19 May 2020 12:55) (45 - 85)  BP: 116/71 (19 May 2020 12:55) (100/65 - 154/60)  BP(mean): --  RR: 16 (19 May 2020 12:55) (16 - 17)  SpO2: 97% (19 May 2020 12:55) (97% - 100%)    ________________________________________________  PHYSICAL EXAM: cheerful, well developed, sitting out on chair  GENERAL: NAD  HEENT: Normocephalic;  conjunctivae and sclerae clear; moist mucous membranes;   NECK : supple  CHEST/LUNG: Clear to auscultation bilaterally with good air entry   HEART: S1 S2  regular; no murmurs, gallops or rubs  ABDOMEN: Soft, Nontender, Nondistended; Bowel sounds present  EXTREMITIES: no cyanosis; no edema; no calf tenderness  SKIN: warm and dry; no rash  NERVOUS SYSTEM:  Awake and alert; Oriented x 2   _________________________________________________  LABS:                        12.7   6.63  )-----------( 162      ( 19 May 2020 07:36 )             38.4     05-19    143  |  109<H>  |  24<H>  ----------------------------<  75  3.7   |  28  |  1.03    Ca    9.1      19 May 2020 07:36          CAPILLARY BLOOD GLUCOSE            RADIOLOGY & ADDITIONAL TESTS:    Imaging Personally Reviewed:  YES/NO    Consultant(s) Notes Reviewed:   YES/ No    Care Discussed with Consultants :     Plan of care was discussed with patient and /or primary care giver; all questions and concerns were addressed and care was aligned with patient's wishes.

## 2020-05-20 DIAGNOSIS — Z71.89 OTHER SPECIFIED COUNSELING: ICD-10-CM

## 2020-05-20 LAB
ANION GAP SERPL CALC-SCNC: 7 MMOL/L — SIGNIFICANT CHANGE UP (ref 5–17)
BUN SERPL-MCNC: 24 MG/DL — HIGH (ref 7–18)
CALCIUM SERPL-MCNC: 9.3 MG/DL — SIGNIFICANT CHANGE UP (ref 8.4–10.5)
CHLORIDE SERPL-SCNC: 110 MMOL/L — HIGH (ref 96–108)
CO2 SERPL-SCNC: 26 MMOL/L — SIGNIFICANT CHANGE UP (ref 22–31)
CREAT SERPL-MCNC: 1.04 MG/DL — SIGNIFICANT CHANGE UP (ref 0.5–1.3)
GLUCOSE SERPL-MCNC: 78 MG/DL — SIGNIFICANT CHANGE UP (ref 70–99)
HCT VFR BLD CALC: 39 % — SIGNIFICANT CHANGE UP (ref 39–50)
HGB BLD-MCNC: 12.8 G/DL — LOW (ref 13–17)
MCHC RBC-ENTMCNC: 30 PG — SIGNIFICANT CHANGE UP (ref 27–34)
MCHC RBC-ENTMCNC: 32.8 GM/DL — SIGNIFICANT CHANGE UP (ref 32–36)
MCV RBC AUTO: 91.3 FL — SIGNIFICANT CHANGE UP (ref 80–100)
NRBC # BLD: 0 /100 WBCS — SIGNIFICANT CHANGE UP (ref 0–0)
PLATELET # BLD AUTO: 168 K/UL — SIGNIFICANT CHANGE UP (ref 150–400)
POTASSIUM SERPL-MCNC: 4.1 MMOL/L — SIGNIFICANT CHANGE UP (ref 3.5–5.3)
POTASSIUM SERPL-SCNC: 4.1 MMOL/L — SIGNIFICANT CHANGE UP (ref 3.5–5.3)
RBC # BLD: 4.27 M/UL — SIGNIFICANT CHANGE UP (ref 4.2–5.8)
RBC # FLD: 13.6 % — SIGNIFICANT CHANGE UP (ref 10.3–14.5)
SARS-COV-2 RNA SPEC QL NAA+PROBE: DETECTED
SODIUM SERPL-SCNC: 143 MMOL/L — SIGNIFICANT CHANGE UP (ref 135–145)
WBC # BLD: 7.13 K/UL — SIGNIFICANT CHANGE UP (ref 3.8–10.5)
WBC # FLD AUTO: 7.13 K/UL — SIGNIFICANT CHANGE UP (ref 3.8–10.5)

## 2020-05-20 RX ADMIN — SERTRALINE 25 MILLIGRAM(S): 25 TABLET, FILM COATED ORAL at 12:12

## 2020-05-20 RX ADMIN — ENOXAPARIN SODIUM 40 MILLIGRAM(S): 100 INJECTION SUBCUTANEOUS at 12:12

## 2020-05-20 RX ADMIN — Medication 1 MILLIGRAM(S): at 12:12

## 2020-05-20 RX ADMIN — POLYETHYLENE GLYCOL 3350 17 GRAM(S): 17 POWDER, FOR SOLUTION ORAL at 12:12

## 2020-05-20 NOTE — PROGRESS NOTE ADULT - SUBJECTIVE AND OBJECTIVE BOX
NP Note discussed with  Primary Attending    Patient is a 84y old  Male who presents with a chief complaint of Covid + (19 May 2020 14:43)    HPI - 84 year old male with PMHx of dementia, depression, frequent falls, and hypothyroidism and no pertinent PSHx presents to the ED from [facility] after testing positive for COVID-19 yesterday. Patient is admitted for Covid 19, not requiring oxygen support.   5/19-Pt. is cheerful and comfortable sitting out on chair today, noted more oriented and conversant, stable for discharge however needs two negative COVID19 tests prior to discharge, had a positive result 5/18 and 5.20.  Pt. is asymptomatic, in NAD.     INTERVAL HPI/OVERNIGHT EVENTS: no new complaints    MEDICATIONS  (STANDING):  enoxaparin Injectable 40 milliGRAM(s) SubCutaneous daily  folic acid 1 milliGRAM(s) Oral daily  furosemide    Tablet 20 milliGRAM(s) Oral daily  levothyroxine 25 MICROGram(s) Oral daily  polyethylene glycol 3350 17 Gram(s) Oral daily  sertraline 25 milliGRAM(s) Oral daily    MEDICATIONS  (PRN):  acetaminophen   Tablet .. 650 milliGRAM(s) Oral every 6 hours PRN Temp greater or equal to 38C (100.4F), Mild Pain (1 - 3)  ALBUTerol    90 MICROgram(s) HFA Inhaler 2 Puff(s) Inhalation every 6 hours PRN Shortness of Breath and/or Wheezing  guaiFENesin   Syrup  (Sugar-Free) 100 milliGRAM(s) Oral every 6 hours PRN Cough      __________________________________________________  REVIEW OF SYSTEMS:    CONSTITUTIONAL: No fever,   EYES: no acute visual disturbances  NECK: No pain or stiffness  RESPIRATORY: No cough; No shortness of breath  CARDIOVASCULAR: No chest pain, no palpitations  GASTROINTESTINAL: No pain. No nausea or vomiting; No diarrhea   NEUROLOGICAL: No headache or numbness, no tremors  MUSCULOSKELETAL: No joint pain, no muscle pain  GENITOURINARY: no dysuria, no frequency, no hesitancy  PSYCHIATRY: no depression , no anxiety  ALL OTHER  ROS negative        Vital Signs Last 24 Hrs  T(C): 36.3 (20 May 2020 06:15), Max: 36.6 (19 May 2020 12:55)  T(F): 97.4 (20 May 2020 06:15), Max: 97.9 (19 May 2020 12:55)  HR: 57 (20 May 2020 06:15) (57 - 65)  BP: 140/65 (20 May 2020 06:15) (116/71 - 140/65)  BP(mean): --  RR: 16 (20 May 2020 06:15) (16 - 17)  SpO2: 100% (20 May 2020 06:15) (97% - 100%)    ________________________________________________  PHYSICAL EXAM:  GENERAL: NAD  HEENT: Normocephalic;  conjunctivae and sclerae clear; moist mucous membranes;   NECK : supple  CHEST/LUNG: Clear to auscultation bilaterally with good air entry   HEART: S1 S2  regular; no murmurs, gallops or rubs  ABDOMEN: Soft, Nontender, Nondistended; Bowel sounds present  EXTREMITIES: no cyanosis; no edema; no calf tenderness  SKIN: warm and dry; no rash  NERVOUS SYSTEM:  Awake and alert; Oriented  to place, person and time ; no new deficits    _________________________________________________  LABS:                        12.8   7.13  )-----------( 168      ( 20 May 2020 07:22 )             39.0     05-20    143  |  110<H>  |  24<H>  ----------------------------<  78  4.1   |  26  |  1.04    Ca    9.3      20 May 2020 07:22          CAPILLARY BLOOD GLUCOSE            RADIOLOGY & ADDITIONAL TESTS:  < from: Xray Chest 1 View- PORTABLE-Urgent (05.15.20 @ 15:26) >  EXAM:  XR CHEST PORTABLE URGENT 1V                            PROCEDURE DATE:  05/15/2020          INTERPRETATION:    Examination: XR CHEST URGENT    History: ADMDIAG1: U07.1 COVID-19 ACUTE RESPIRATORY DISEASE/, covid +    Comparison: 5/14/2020 and3/22/2019    Findings: Normal heart size. Clear lungs. No pneumothorax or pleural effusion. Air over the upper abdomen is likely in the patient's high positioned transverse colon.      Impression:  1.  No active pulmonary disease.      DISCRETE X-RAYDATA:  Percent of LEFT lung opacification: Clear  Percent of RIGHT lung opacification: Clear  Change in lung opacification from most recent x-ray (<=3 days): Stable  Change from prior dated 3 or more days (same admission): No Prior          SUMA COSTA M.D., ATTENDING RADIOLOGIST  This document has been electronically signed. May 15 2020  3:37PM        < end of copied text >  < from: CT Angio Chest w/ IV Cont (05.14.20 @ 19:57) >  EXAM:  CT ANGIO CHEST (W)AW IC                            PROCEDURE DATE:  05/14/2020          INTERPRETATION:  CT ANGIO CHEST WITHOUT AND OR WITH IV CONTRAST    CLINICAL INFORMATION:  elevated d-dimer    TECHNIQUE: Contrast enhanced CT pulmonary angiogram was performed. Multiplanar CT and HRCT images were reviewed. Maximum intensity projection (MIP) images are reconstructed as per CT angiography protocol. Images were acquired during the administration of 46 cc Omnipaque 350 IV contrast. This study was performed using automatic exposure control (radiation dose reduction software) to obtain a diagnostic image quality scan with patient dose as low as reasonably achievable.    COMPARISON: Same day chest x-ray    PULMONARY ARTERIES: No pulmonary embolism.    LUNGS, AIRWAYS: The central airways are patent. The lungs are clear aside from a small nonspecific central groundglass opacity in the right lower lobe.    PLEURA: No pleural abnormality.    VESSELS: Normal caliber aorta.     HEART: Normal heart size. No pericardial effusion. Coronary artery calcifications are present.    MEDIASTINUM, LEE, AXILLAE: No adenopathy.    UPPER ABDOMEN: Limited visualization is unremarkable.    BONES AND CHEST WALL: No acute bony abnormality.    IMPRESSION:     No pulmonary embolism.    The lungs are clear aside from a small nonspecific central groundglass opacity in the right lower lobe.                 SILVERIO DORSEY M.D., ATTENDING RADIOLOGIST  This document has been electronically signed. May 14 2020 8:13PM    < end of copied text >    Imaging Personally Reviewed:  YES    Consultant(s) Notes Reviewed:   No    Care Discussed with Consultants :     Plan of care was discussed with patient and /or primary care giver; all questions and concerns were addressed and care was aligned with patient's wishes.

## 2020-05-20 NOTE — DIETITIAN INITIAL EVALUATION ADULT. - OTHER INFO
Pt  with Covid +. Pt is on Airborne isolation . Pt  seen for LOS. On regular Diet . Observed Lunch meal. Pt ate only Dessert. D/W PCA Pt ate ~50 % of meal for B fast. Pt may benefit from Soft / Mech soft food and supplement.

## 2020-05-21 LAB
ANION GAP SERPL CALC-SCNC: 6 MMOL/L — SIGNIFICANT CHANGE UP (ref 5–17)
BUN SERPL-MCNC: 24 MG/DL — HIGH (ref 7–18)
CALCIUM SERPL-MCNC: 9.3 MG/DL — SIGNIFICANT CHANGE UP (ref 8.4–10.5)
CHLORIDE SERPL-SCNC: 109 MMOL/L — HIGH (ref 96–108)
CO2 SERPL-SCNC: 28 MMOL/L — SIGNIFICANT CHANGE UP (ref 22–31)
CREAT SERPL-MCNC: 1.04 MG/DL — SIGNIFICANT CHANGE UP (ref 0.5–1.3)
GLUCOSE SERPL-MCNC: 81 MG/DL — SIGNIFICANT CHANGE UP (ref 70–99)
HCT VFR BLD CALC: 39.4 % — SIGNIFICANT CHANGE UP (ref 39–50)
HGB BLD-MCNC: 12.9 G/DL — LOW (ref 13–17)
MCHC RBC-ENTMCNC: 29.9 PG — SIGNIFICANT CHANGE UP (ref 27–34)
MCHC RBC-ENTMCNC: 32.7 GM/DL — SIGNIFICANT CHANGE UP (ref 32–36)
MCV RBC AUTO: 91.4 FL — SIGNIFICANT CHANGE UP (ref 80–100)
NRBC # BLD: 0 /100 WBCS — SIGNIFICANT CHANGE UP (ref 0–0)
PLATELET # BLD AUTO: 167 K/UL — SIGNIFICANT CHANGE UP (ref 150–400)
POTASSIUM SERPL-MCNC: 4.5 MMOL/L — SIGNIFICANT CHANGE UP (ref 3.5–5.3)
POTASSIUM SERPL-SCNC: 4.5 MMOL/L — SIGNIFICANT CHANGE UP (ref 3.5–5.3)
RBC # BLD: 4.31 M/UL — SIGNIFICANT CHANGE UP (ref 4.2–5.8)
RBC # FLD: 13.7 % — SIGNIFICANT CHANGE UP (ref 10.3–14.5)
SODIUM SERPL-SCNC: 143 MMOL/L — SIGNIFICANT CHANGE UP (ref 135–145)
WBC # BLD: 4.99 K/UL — SIGNIFICANT CHANGE UP (ref 3.8–10.5)
WBC # FLD AUTO: 4.99 K/UL — SIGNIFICANT CHANGE UP (ref 3.8–10.5)

## 2020-05-21 RX ADMIN — ENOXAPARIN SODIUM 40 MILLIGRAM(S): 100 INJECTION SUBCUTANEOUS at 13:18

## 2020-05-21 RX ADMIN — POLYETHYLENE GLYCOL 3350 17 GRAM(S): 17 POWDER, FOR SOLUTION ORAL at 13:18

## 2020-05-21 RX ADMIN — SERTRALINE 25 MILLIGRAM(S): 25 TABLET, FILM COATED ORAL at 13:18

## 2020-05-21 RX ADMIN — Medication 1 MILLIGRAM(S): at 13:18

## 2020-05-21 NOTE — PROGRESS NOTE ADULT - SUBJECTIVE AND OBJECTIVE BOX
NP Note discussed with  Primary Attending    Patient is a 84y old  Male who presents with a chief complaint of Covid + (19 May 2020 14:43)    HPI - 84 year old male with PMHx of dementia, depression, frequent falls, and hypothyroidism and no pertinent PSHx presents to the ED from [facility] after testing positive for COVID-19 yesterday. Patient is admitted for Covid 19, not requiring oxygen support.   5/19-Pt. is cheerful and comfortable sitting out on chair today, noted more oriented and conversant, stable for discharge however needs two negative COVID19 tests prior to discharge, had a positive result 5/18 and 5.20.  Pt. is asymptomatic, in NAD.     INTERVAL HPI/OVERNIGHT EVENTS:   Pt awake, in bed, offers no complaints.  Tolerating PO diet.     MEDICATIONS  (STANDING):  enoxaparin Injectable 40 milliGRAM(s) SubCutaneous daily  folic acid 1 milliGRAM(s) Oral daily  furosemide    Tablet 20 milliGRAM(s) Oral daily  levothyroxine 25 MICROGram(s) Oral daily  polyethylene glycol 3350 17 Gram(s) Oral daily  sertraline 25 milliGRAM(s) Oral daily    MEDICATIONS  (PRN):  acetaminophen   Tablet .. 650 milliGRAM(s) Oral every 6 hours PRN Temp greater or equal to 38C (100.4F), Mild Pain (1 - 3)  ALBUTerol    90 MICROgram(s) HFA Inhaler 2 Puff(s) Inhalation every 6 hours PRN Shortness of Breath and/or Wheezing  guaiFENesin   Syrup  (Sugar-Free) 100 milliGRAM(s) Oral every 6 hours PRN Cough      __________________________________________________  REVIEW OF SYSTEMS:    CONSTITUTIONAL: No fever,   EYES: no acute visual disturbances  NECK: No pain or stiffness  RESPIRATORY: No cough; No shortness of breath  CARDIOVASCULAR: No chest pain, no palpitations  GASTROINTESTINAL: No pain. No nausea or vomiting; No diarrhea   NEUROLOGICAL: No headache or numbness, no tremors  MUSCULOSKELETAL: No joint pain, no muscle pain  GENITOURINARY: no dysuria, no frequency, no hesitancy  PSYCHIATRY: no depression , no anxiety  ALL OTHER  ROS negative        Vital Signs Last 24 Hrs  T(C): 36.3 (20 May 2020 06:15), Max: 36.6 (19 May 2020 12:55)  T(F): 97.4 (20 May 2020 06:15), Max: 97.9 (19 May 2020 12:55)  HR: 57 (20 May 2020 06:15) (57 - 65)  BP: 140/65 (20 May 2020 06:15) (116/71 - 140/65)  BP(mean): --  RR: 16 (20 May 2020 06:15) (16 - 17)  SpO2: 100% (20 May 2020 06:15) (97% - 100%)    ________________________________________________  PHYSICAL EXAM:  GENERAL: Elderly male, in NAD  HEENT: Normocephalic;  conjunctivae and sclerae clear; moist mucous membranes;   NECK : supple  CHEST/LUNG: No audible cough or wheezes, respirations unlabored   HEART: S1 S2  regular; no murmurs, gallops or rubs  ABDOMEN: Soft, Nontender, Nondistended  EXTREMITIES: no cyanosis; no edema; no calf tenderness  SKIN: warm and dry; no rash  NERVOUS SYSTEM:  Awake and alert; Oriented  to place, person and time ; no new deficits    _________________________________________________  LABS:                        12.8   7.13  )-----------( 168      ( 20 May 2020 07:22 )             39.0     05-20    143  |  110<H>  |  24<H>  ----------------------------<  78  4.1   |  26  |  1.04    Ca    9.3      20 May 2020 07:22          CAPILLARY BLOOD GLUCOSE            RADIOLOGY & ADDITIONAL TESTS:  < from: Xray Chest 1 View- PORTABLE-Urgent (05.15.20 @ 15:26) >  EXAM:  XR CHEST PORTABLE URGENT 1V                            PROCEDURE DATE:  05/15/2020          INTERPRETATION:    Examination: XR CHEST URGENT    History: ADMDIAG1: U07.1 COVID-19 ACUTE RESPIRATORY DISEASE/, covid +    Comparison: 5/14/2020 and3/22/2019    Findings: Normal heart size. Clear lungs. No pneumothorax or pleural effusion. Air over the upper abdomen is likely in the patient's high positioned transverse colon.      Impression:  1.  No active pulmonary disease.      DISCRETE X-RAYDATA:  Percent of LEFT lung opacification: Clear  Percent of RIGHT lung opacification: Clear  Change in lung opacification from most recent x-ray (<=3 days): Stable  Change from prior dated 3 or more days (same admission): No Prior          SUMA COSTA M.D., ATTENDING RADIOLOGIST  This document has been electronically signed. May 15 2020  3:37PM        < end of copied text >  < from: CT Angio Chest w/ IV Cont (05.14.20 @ 19:57) >  EXAM:  CT ANGIO CHEST (W)AW IC                            PROCEDURE DATE:  05/14/2020          INTERPRETATION:  CT ANGIO CHEST WITHOUT AND OR WITH IV CONTRAST    CLINICAL INFORMATION:  elevated d-dimer    TECHNIQUE: Contrast enhanced CT pulmonary angiogram was performed. Multiplanar CT and HRCT images were reviewed. Maximum intensity projection (MIP) images are reconstructed as per CT angiography protocol. Images were acquired during the administration of 46 cc Omnipaque 350 IV contrast. This study was performed using automatic exposure control (radiation dose reduction software) to obtain a diagnostic image quality scan with patient dose as low as reasonably achievable.    COMPARISON: Same day chest x-ray    PULMONARY ARTERIES: No pulmonary embolism.    LUNGS, AIRWAYS: The central airways are patent. The lungs are clear aside from a small nonspecific central groundglass opacity in the right lower lobe.    PLEURA: No pleural abnormality.    VESSELS: Normal caliber aorta.     HEART: Normal heart size. No pericardial effusion. Coronary artery calcifications are present.    MEDIASTINUM, LEE, AXILLAE: No adenopathy.    UPPER ABDOMEN: Limited visualization is unremarkable.    BONES AND CHEST WALL: No acute bony abnormality.    IMPRESSION:     No pulmonary embolism.    The lungs are clear aside from a small nonspecific central groundglass opacity in the right lower lobe.                 SILVERIO DORSEY M.D., ATTENDING RADIOLOGIST  This document has been electronically signed. May 14 2020 8:13PM    < end of copied text >    Imaging Personally Reviewed:  YES    Consultant(s) Notes Reviewed:   No    Care Discussed with Consultants :     Plan of care was discussed with patient and /or primary care giver; all questions and concerns were addressed and care was aligned with patient's wishes.

## 2020-05-22 LAB
ALBUMIN SERPL ELPH-MCNC: 2.8 G/DL — LOW (ref 3.5–5)
ALP SERPL-CCNC: 101 U/L — SIGNIFICANT CHANGE UP (ref 40–120)
ALT FLD-CCNC: 18 U/L DA — SIGNIFICANT CHANGE UP (ref 10–60)
ANION GAP SERPL CALC-SCNC: 4 MMOL/L — LOW (ref 5–17)
AST SERPL-CCNC: 17 U/L — SIGNIFICANT CHANGE UP (ref 10–40)
BILIRUB SERPL-MCNC: 0.5 MG/DL — SIGNIFICANT CHANGE UP (ref 0.2–1.2)
BUN SERPL-MCNC: 21 MG/DL — HIGH (ref 7–18)
CALCIUM SERPL-MCNC: 8.7 MG/DL — SIGNIFICANT CHANGE UP (ref 8.4–10.5)
CHLORIDE SERPL-SCNC: 109 MMOL/L — HIGH (ref 96–108)
CO2 SERPL-SCNC: 28 MMOL/L — SIGNIFICANT CHANGE UP (ref 22–31)
CREAT SERPL-MCNC: 1 MG/DL — SIGNIFICANT CHANGE UP (ref 0.5–1.3)
GLUCOSE SERPL-MCNC: 77 MG/DL — SIGNIFICANT CHANGE UP (ref 70–99)
HCT VFR BLD CALC: 38.7 % — LOW (ref 39–50)
HGB BLD-MCNC: 12.7 G/DL — LOW (ref 13–17)
MAGNESIUM SERPL-MCNC: 2.1 MG/DL — SIGNIFICANT CHANGE UP (ref 1.6–2.6)
MCHC RBC-ENTMCNC: 29.6 PG — SIGNIFICANT CHANGE UP (ref 27–34)
MCHC RBC-ENTMCNC: 32.8 GM/DL — SIGNIFICANT CHANGE UP (ref 32–36)
MCV RBC AUTO: 90.2 FL — SIGNIFICANT CHANGE UP (ref 80–100)
NRBC # BLD: 0 /100 WBCS — SIGNIFICANT CHANGE UP (ref 0–0)
PHOSPHATE SERPL-MCNC: 2.3 MG/DL — LOW (ref 2.5–4.5)
PLATELET # BLD AUTO: 169 K/UL — SIGNIFICANT CHANGE UP (ref 150–400)
POTASSIUM SERPL-MCNC: 4 MMOL/L — SIGNIFICANT CHANGE UP (ref 3.5–5.3)
POTASSIUM SERPL-SCNC: 4 MMOL/L — SIGNIFICANT CHANGE UP (ref 3.5–5.3)
PROT SERPL-MCNC: 6.5 G/DL — SIGNIFICANT CHANGE UP (ref 6–8.3)
RBC # BLD: 4.29 M/UL — SIGNIFICANT CHANGE UP (ref 4.2–5.8)
RBC # FLD: 13.5 % — SIGNIFICANT CHANGE UP (ref 10.3–14.5)
SODIUM SERPL-SCNC: 141 MMOL/L — SIGNIFICANT CHANGE UP (ref 135–145)
WBC # BLD: 5.33 K/UL — SIGNIFICANT CHANGE UP (ref 3.8–10.5)
WBC # FLD AUTO: 5.33 K/UL — SIGNIFICANT CHANGE UP (ref 3.8–10.5)

## 2020-05-22 PROCEDURE — 99232 SBSQ HOSP IP/OBS MODERATE 35: CPT

## 2020-05-22 NOTE — PROGRESS NOTE ADULT - SUBJECTIVE AND OBJECTIVE BOX
HPI:  84 year old male with PMHx of dementia, depression, frequent falls, and hypothyroidism and no pertinent PSHx presents to the ED from [facility] after testing positive for COVID-19 yesterday. Patient is admitted for Covid 19, not requiring oxygen support.   Pt. is cheerful and comfortable sitting out on chair today, noted more oriented and conversant, stable for discharge however needs negative COVID19 tests prior to discharge, had a positive result 5/18 and 5.20.  Will recheck 5/23.  Patient examined at bedside, resting comfortably, denies pain, SOB or NVD.  NAD    OVERNIGHT EVENTS:  No new overnight events     REVIEW OF SYSTEMS:      CONSTITUTIONAL: No fever,   EYES: no acute visual disturbances  NECK: No pain or stiffness  RESPIRATORY: No cough; No shortness of breath  CARDIOVASCULAR: No chest pain, no palpitations  GASTROINTESTINAL: No pain. No nausea, vomiting or diarrhea   NEUROLOGICAL: No headache or numbness, no tremors  MUSCULOSKELETAL: No joint pain, no muscle pain  GENITOURINARY: no dysuria, no frequency, no hesitancy  PSYCHIATRY: no depression , no anxiety  ALL OTHER  ROS negative        Vital Signs Last 24 Hrs  T(C): 36.3 (22 May 2020 05:20), Max: 36.9 (21 May 2020 20:45)  T(F): 97.3 (22 May 2020 05:20), Max: 98.4 (21 May 2020 20:45)  HR: 50 (22 May 2020 05:20) (50 - 66)  BP: 116/59 (22 May 2020 05:20) (114/62 - 130/60)  BP(mean): --  RR: 17 (22 May 2020 05:20) (16 - 17)  SpO2: 98% (22 May 2020 11:16) (97% - 100%)    ________________________________________________  PHYSICAL EXAM:    GENERAL: NAD  HEENT: Normocephalic; conjunctivae and sclerae clear; moist mucous membranes;   NECK : supple, no JVD  CHEST/LUNG: Clear to auscultation bilaterally   HEART: S1 S2  regular  ABDOMEN: Soft, Nontender, Nondistended; Bowel sounds present  EXTREMITIES: no cyanosis; no LE edema; no calf tenderness  SKIN: warm and dry; no rash  NERVOUS SYSTEM:  Alert & Oriented x3; no new deficits    _________________________________________________  CURRENT MEDICATIONS:    MEDICATIONS  (STANDING):  enoxaparin Injectable 40 milliGRAM(s) SubCutaneous daily  folic acid 1 milliGRAM(s) Oral daily  furosemide    Tablet 20 milliGRAM(s) Oral daily  levothyroxine 25 MICROGram(s) Oral daily  polyethylene glycol 3350 17 Gram(s) Oral daily  sertraline 25 milliGRAM(s) Oral daily    MEDICATIONS  (PRN):  acetaminophen   Tablet .. 650 milliGRAM(s) Oral every 6 hours PRN Temp greater or equal to 38C (100.4F), Mild Pain (1 - 3)  ALBUTerol    90 MICROgram(s) HFA Inhaler 2 Puff(s) Inhalation every 6 hours PRN Shortness of Breath and/or Wheezing  guaiFENesin   Syrup  (Sugar-Free) 100 milliGRAM(s) Oral every 6 hours PRN Cough      __________________________________________________  LABS:                          12.7   5.33  )-----------( 169      ( 22 May 2020 06:23 )             38.7     05-22    141  |  109<H>  |  21<H>  ----------------------------<  77  4.0   |  28  |  1.00    Ca    8.7      22 May 2020 06:23  Phos  2.3     05-22  Mg     2.1     05-22    TPro  6.5  /  Alb  2.8<L>  /  TBili  0.5  /  DBili  x   /  AST  17  /  ALT  18  /  AlkPhos  101  05-22        CAPILLARY BLOOD GLUCOSE          __________________________________________________  RADIOLOGY & ADDITIONAL TESTS:    Imaging Personally Reviewed:  YES    < from: Xray Chest 1 View- PORTABLE-Urgent (05.15.20 @ 15:26) >  Comparison: 5/14/2020 and3/22/2019    Findings: Normal heart size. Clear lungs. No pneumothorax or pleural effusion. Air over the upper abdomen is likely in the patient's high positioned transverse colon.      Impression:  1.  No active pulmonary disease.      DISCRETE X-RAYDATA:  Percent of LEFT lung opacification: Clear  Percent of RIGHT lung opacification: Clear  Change in lung opacification from most recent x-ray (<=3 days): Stable  Change from prior dated 3 or more days (same admission): No Prior    < end of copied text >    Consultant(s) Notes Reviewed:   YES     Plan of care was discussed with patient and /or primary care giver; all questions and concerns were addressed and care was aligned with patient's wishes.    Plan discussed with attending and consulting physicians.

## 2020-05-22 NOTE — CHART NOTE - NSCHARTNOTEFT_GEN_A_CORE
Kari Soto [phone] Pt Niece called, updated on plan of care,  questions answered, emotional support given.  Vital Signs Last 24 Hrs  T(C): 36.9 (21 May 2020 20:45), Max: 36.9 (21 May 2020 20:45)  T(F): 98.4 (21 May 2020 20:45), Max: 98.4 (21 May 2020 20:45)  HR: 66 (21 May 2020 20:45) (51 - 66)  BP: 114/62 (21 May 2020 20:45) (114/62 - 130/60)  BP(mean): --  RR: 17 (21 May 2020 20:45) (16 - 17)  SpO2: 97% (21 May 2020 20:45) (97% - 100%) Kari Soto 561 [phone])  pt's Niece called, updated on plan of care,  questions answered, emotional support given.  Vital Signs Last 24 Hrs  T(C): 36.9 (21 May 2020 20:45), Max: 36.9 (21 May 2020 20:45)  T(F): 98.4 (21 May 2020 20:45), Max: 98.4 (21 May 2020 20:45)  HR: 66 (21 May 2020 20:45) (51 - 66)  BP: 114/62 (21 May 2020 20:45) (114/62 - 130/60)  BP(mean): --  RR: 17 (21 May 2020 20:45) (16 - 17)  SpO2: 97% (21 May 2020 20:45) (97% - 100%)

## 2020-05-23 LAB
ALBUMIN SERPL ELPH-MCNC: 2.9 G/DL — LOW (ref 3.5–5)
ALP SERPL-CCNC: 106 U/L — SIGNIFICANT CHANGE UP (ref 40–120)
ALT FLD-CCNC: 16 U/L DA — SIGNIFICANT CHANGE UP (ref 10–60)
ANION GAP SERPL CALC-SCNC: 7 MMOL/L — SIGNIFICANT CHANGE UP (ref 5–17)
AST SERPL-CCNC: 17 U/L — SIGNIFICANT CHANGE UP (ref 10–40)
BILIRUB SERPL-MCNC: 0.5 MG/DL — SIGNIFICANT CHANGE UP (ref 0.2–1.2)
BUN SERPL-MCNC: 20 MG/DL — HIGH (ref 7–18)
CALCIUM SERPL-MCNC: 9.1 MG/DL — SIGNIFICANT CHANGE UP (ref 8.4–10.5)
CHLORIDE SERPL-SCNC: 108 MMOL/L — SIGNIFICANT CHANGE UP (ref 96–108)
CO2 SERPL-SCNC: 26 MMOL/L — SIGNIFICANT CHANGE UP (ref 22–31)
CREAT SERPL-MCNC: 0.98 MG/DL — SIGNIFICANT CHANGE UP (ref 0.5–1.3)
GLUCOSE SERPL-MCNC: 77 MG/DL — SIGNIFICANT CHANGE UP (ref 70–99)
HCT VFR BLD CALC: 38.5 % — LOW (ref 39–50)
HGB BLD-MCNC: 12.7 G/DL — LOW (ref 13–17)
MCHC RBC-ENTMCNC: 29.9 PG — SIGNIFICANT CHANGE UP (ref 27–34)
MCHC RBC-ENTMCNC: 33 GM/DL — SIGNIFICANT CHANGE UP (ref 32–36)
MCV RBC AUTO: 90.6 FL — SIGNIFICANT CHANGE UP (ref 80–100)
NRBC # BLD: 0 /100 WBCS — SIGNIFICANT CHANGE UP (ref 0–0)
PLATELET # BLD AUTO: 163 K/UL — SIGNIFICANT CHANGE UP (ref 150–400)
POTASSIUM SERPL-MCNC: 3.9 MMOL/L — SIGNIFICANT CHANGE UP (ref 3.5–5.3)
POTASSIUM SERPL-SCNC: 3.9 MMOL/L — SIGNIFICANT CHANGE UP (ref 3.5–5.3)
PROT SERPL-MCNC: 6.5 G/DL — SIGNIFICANT CHANGE UP (ref 6–8.3)
RBC # BLD: 4.25 M/UL — SIGNIFICANT CHANGE UP (ref 4.2–5.8)
RBC # FLD: 13.5 % — SIGNIFICANT CHANGE UP (ref 10.3–14.5)
SODIUM SERPL-SCNC: 141 MMOL/L — SIGNIFICANT CHANGE UP (ref 135–145)
WBC # BLD: 5.34 K/UL — SIGNIFICANT CHANGE UP (ref 3.8–10.5)
WBC # FLD AUTO: 5.34 K/UL — SIGNIFICANT CHANGE UP (ref 3.8–10.5)

## 2020-05-23 RX ADMIN — Medication 1 MILLIGRAM(S): at 12:18

## 2020-05-23 RX ADMIN — SERTRALINE 25 MILLIGRAM(S): 25 TABLET, FILM COATED ORAL at 12:18

## 2020-05-23 RX ADMIN — POLYETHYLENE GLYCOL 3350 17 GRAM(S): 17 POWDER, FOR SOLUTION ORAL at 12:18

## 2020-05-23 RX ADMIN — ENOXAPARIN SODIUM 40 MILLIGRAM(S): 100 INJECTION SUBCUTANEOUS at 12:18

## 2020-05-23 NOTE — PROGRESS NOTE ADULT - SUBJECTIVE AND OBJECTIVE BOX
SUBJECTIVE / OVERNIGHT EVENTS: pt seen and examined      MEDICATIONS  (STANDING):  enoxaparin Injectable 40 milliGRAM(s) SubCutaneous daily  folic acid 1 milliGRAM(s) Oral daily  furosemide    Tablet 20 milliGRAM(s) Oral daily  levothyroxine 25 MICROGram(s) Oral daily  polyethylene glycol 3350 17 Gram(s) Oral daily  sertraline 25 milliGRAM(s) Oral daily    MEDICATIONS  (PRN):  acetaminophen   Tablet .. 650 milliGRAM(s) Oral every 6 hours PRN Temp greater or equal to 38C (100.4F), Mild Pain (1 - 3)  ALBUTerol    90 MICROgram(s) HFA Inhaler 2 Puff(s) Inhalation every 6 hours PRN Shortness of Breath and/or Wheezing  guaiFENesin   Syrup  (Sugar-Free) 100 milliGRAM(s) Oral every 6 hours PRN Cough      Vital Signs Last 24 Hrs  T(C): 36.7 (23 May 2020 14:58), Max: 36.9 (23 May 2020 05:10)  T(F): 98 (23 May 2020 14:58), Max: 98.4 (23 May 2020 05:10)  HR: 58 (23 May 2020 14:58) (58 - 109)  BP: 113/56 (23 May 2020 14:58) (113/56 - 127/68)  BP(mean): --  RR: 16 (23 May 2020 14:58) (16 - 17)  SpO2: 99% (23 May 2020 14:58) (97% - 100%)  CAPILLARY BLOOD GLUCOSE        I&O's Summary    23 May 2020 07:01  -  23 May 2020 18:57  --------------------------------------------------------  IN: 0 mL / OUT: 200 mL / NET: -200 mL  PHYSICAL EXAM:  GENERAL: NAD  EYES: EOMI, PERRLA  NECK: Supple, No JVD  CHEST/LUNG: dec breath sounds at bases  HEART:  S1 , S2 +  ABDOMEN: soft , bs+  EXTREMITIES:  no edema  NEUROLOGY:alert awake       LABS:                        12.7   5.34  )-----------( 163      ( 23 May 2020 08:23 )             38.5     05-23    141  |  108  |  20<H>  ----------------------------<  77  3.9   |  26  |  0.98    Ca    9.1      23 May 2020 08:23  Phos  2.3     05-22  Mg     2.1     05-22    TPro  6.5  /  Alb  2.9<L>  /  TBili  0.5  /  DBili  x   /  AST  17  /  ALT  16  /  AlkPhos  106  05-23              RADIOLOGY & ADDITIONAL TESTS:    Imaging Personally Reviewed:    Consultant(s) Notes Reviewed:      Care Discussed with Consultants/Other Providers:

## 2020-05-24 LAB
ALBUMIN SERPL ELPH-MCNC: 2.8 G/DL — LOW (ref 3.5–5)
ALP SERPL-CCNC: 101 U/L — SIGNIFICANT CHANGE UP (ref 40–120)
ALT FLD-CCNC: 15 U/L DA — SIGNIFICANT CHANGE UP (ref 10–60)
ANION GAP SERPL CALC-SCNC: 6 MMOL/L — SIGNIFICANT CHANGE UP (ref 5–17)
AST SERPL-CCNC: 12 U/L — SIGNIFICANT CHANGE UP (ref 10–40)
BILIRUB SERPL-MCNC: 0.3 MG/DL — SIGNIFICANT CHANGE UP (ref 0.2–1.2)
BUN SERPL-MCNC: 22 MG/DL — HIGH (ref 7–18)
CALCIUM SERPL-MCNC: 8.7 MG/DL — SIGNIFICANT CHANGE UP (ref 8.4–10.5)
CHLORIDE SERPL-SCNC: 109 MMOL/L — HIGH (ref 96–108)
CO2 SERPL-SCNC: 27 MMOL/L — SIGNIFICANT CHANGE UP (ref 22–31)
CREAT SERPL-MCNC: 1 MG/DL — SIGNIFICANT CHANGE UP (ref 0.5–1.3)
GLUCOSE SERPL-MCNC: 73 MG/DL — SIGNIFICANT CHANGE UP (ref 70–99)
HCT VFR BLD CALC: 38.3 % — LOW (ref 39–50)
HGB BLD-MCNC: 12.6 G/DL — LOW (ref 13–17)
MCHC RBC-ENTMCNC: 30.2 PG — SIGNIFICANT CHANGE UP (ref 27–34)
MCHC RBC-ENTMCNC: 32.9 GM/DL — SIGNIFICANT CHANGE UP (ref 32–36)
MCV RBC AUTO: 91.8 FL — SIGNIFICANT CHANGE UP (ref 80–100)
NRBC # BLD: 0 /100 WBCS — SIGNIFICANT CHANGE UP (ref 0–0)
PLATELET # BLD AUTO: 170 K/UL — SIGNIFICANT CHANGE UP (ref 150–400)
POTASSIUM SERPL-MCNC: 4.1 MMOL/L — SIGNIFICANT CHANGE UP (ref 3.5–5.3)
POTASSIUM SERPL-SCNC: 4.1 MMOL/L — SIGNIFICANT CHANGE UP (ref 3.5–5.3)
PROT SERPL-MCNC: 6.4 G/DL — SIGNIFICANT CHANGE UP (ref 6–8.3)
RBC # BLD: 4.17 M/UL — LOW (ref 4.2–5.8)
RBC # FLD: 13.7 % — SIGNIFICANT CHANGE UP (ref 10.3–14.5)
SARS-COV-2 RNA SPEC QL NAA+PROBE: DETECTED
SODIUM SERPL-SCNC: 142 MMOL/L — SIGNIFICANT CHANGE UP (ref 135–145)
WBC # BLD: 5.77 K/UL — SIGNIFICANT CHANGE UP (ref 3.8–10.5)
WBC # FLD AUTO: 5.77 K/UL — SIGNIFICANT CHANGE UP (ref 3.8–10.5)

## 2020-05-24 RX ADMIN — SERTRALINE 25 MILLIGRAM(S): 25 TABLET, FILM COATED ORAL at 11:50

## 2020-05-24 RX ADMIN — Medication 1 MILLIGRAM(S): at 11:50

## 2020-05-24 RX ADMIN — POLYETHYLENE GLYCOL 3350 17 GRAM(S): 17 POWDER, FOR SOLUTION ORAL at 11:50

## 2020-05-24 RX ADMIN — ENOXAPARIN SODIUM 40 MILLIGRAM(S): 100 INJECTION SUBCUTANEOUS at 11:50

## 2020-05-24 NOTE — PROGRESS NOTE ADULT - SUBJECTIVE AND OBJECTIVE BOX
SUBJECTIVE / OVERNIGHT EVENTS: pt seen and examined    MEDICATIONS  (STANDING):  enoxaparin Injectable 40 milliGRAM(s) SubCutaneous daily  folic acid 1 milliGRAM(s) Oral daily  furosemide    Tablet 20 milliGRAM(s) Oral daily  levothyroxine 25 MICROGram(s) Oral daily  polyethylene glycol 3350 17 Gram(s) Oral daily  sertraline 25 milliGRAM(s) Oral daily    MEDICATIONS  (PRN):  acetaminophen   Tablet .. 650 milliGRAM(s) Oral every 6 hours PRN Temp greater or equal to 38C (100.4F), Mild Pain (1 - 3)  ALBUTerol    90 MICROgram(s) HFA Inhaler 2 Puff(s) Inhalation every 6 hours PRN Shortness of Breath and/or Wheezing  guaiFENesin   Syrup  (Sugar-Free) 100 milliGRAM(s) Oral every 6 hours PRN Cough    Vital Signs Last 24 Hrs  T(C): 36.3 (24 May 2020 21:39), Max: 36.9 (24 May 2020 13:52)  T(F): 97.4 (24 May 2020 21:39), Max: 98.4 (24 May 2020 13:52)  HR: 59 (24 May 2020 21:39) (51 - 62)  BP: 125/64 (24 May 2020 21:39) (115/65 - 134/64)  BP(mean): --  RR: 16 (24 May 2020 21:39) (14 - 17)  SpO2: 98% (24 May 2020 21:39) (97% - 99%)    GENERAL: NAD  EYES: EOMI, PERRLA  NECK: Supple, No JVD  CHEST/LUNG: dec breath sounds at bases  HEART:  S1 , S2 +  ABDOMEN: soft , bs+  EXTREMITIES:  no edema  NEUROLOGY:alert awake       LABS:  05-24    142  |  109<H>  |  22<H>  ----------------------------<  73  4.1   |  27  |  1.00    Ca    8.7      24 May 2020 06:19    TPro  6.4  /  Alb  2.8<L>  /  TBili  0.3  /  DBili      /  AST  12  /  ALT  15  /  AlkPhos  101  05-24    Creatinine Trend: 1.00 <--, 0.98 <--, 1.00 <--, 1.04 <--, 1.04 <--, 1.03 <--, 1.00 <--                        12.6   5.77  )-----------( 170      ( 24 May 2020 06:19 )             38.3     Urine Studies:            LIVER FUNCTIONS - ( 24 May 2020 06:19 )  Alb: 2.8 g/dL / Pro: 6.4 g/dL / ALK PHOS: 101 U/L / ALT: 15 U/L DA / AST: 12 U/L / GGT: x               RADIOLOGY & ADDITIONAL TESTS:    Imaging Personally Reviewed:    Consultant(s) Notes Reviewed:      Care Discussed with Consultants/Other Providers:

## 2020-05-25 LAB
ALBUMIN SERPL ELPH-MCNC: 2.8 G/DL — LOW (ref 3.5–5)
ALP SERPL-CCNC: 98 U/L — SIGNIFICANT CHANGE UP (ref 40–120)
ALT FLD-CCNC: 15 U/L DA — SIGNIFICANT CHANGE UP (ref 10–60)
ANION GAP SERPL CALC-SCNC: 8 MMOL/L — SIGNIFICANT CHANGE UP (ref 5–17)
AST SERPL-CCNC: 16 U/L — SIGNIFICANT CHANGE UP (ref 10–40)
BILIRUB SERPL-MCNC: 0.3 MG/DL — SIGNIFICANT CHANGE UP (ref 0.2–1.2)
BUN SERPL-MCNC: 22 MG/DL — HIGH (ref 7–18)
CALCIUM SERPL-MCNC: 9.2 MG/DL — SIGNIFICANT CHANGE UP (ref 8.4–10.5)
CHLORIDE SERPL-SCNC: 108 MMOL/L — SIGNIFICANT CHANGE UP (ref 96–108)
CO2 SERPL-SCNC: 26 MMOL/L — SIGNIFICANT CHANGE UP (ref 22–31)
CREAT SERPL-MCNC: 1.03 MG/DL — SIGNIFICANT CHANGE UP (ref 0.5–1.3)
GLUCOSE SERPL-MCNC: 72 MG/DL — SIGNIFICANT CHANGE UP (ref 70–99)
HCT VFR BLD CALC: 38.8 % — LOW (ref 39–50)
HGB BLD-MCNC: 12.8 G/DL — LOW (ref 13–17)
MCHC RBC-ENTMCNC: 30.2 PG — SIGNIFICANT CHANGE UP (ref 27–34)
MCHC RBC-ENTMCNC: 33 GM/DL — SIGNIFICANT CHANGE UP (ref 32–36)
MCV RBC AUTO: 91.5 FL — SIGNIFICANT CHANGE UP (ref 80–100)
NRBC # BLD: 0 /100 WBCS — SIGNIFICANT CHANGE UP (ref 0–0)
PLATELET # BLD AUTO: 166 K/UL — SIGNIFICANT CHANGE UP (ref 150–400)
POTASSIUM SERPL-MCNC: 3.9 MMOL/L — SIGNIFICANT CHANGE UP (ref 3.5–5.3)
POTASSIUM SERPL-SCNC: 3.9 MMOL/L — SIGNIFICANT CHANGE UP (ref 3.5–5.3)
PROT SERPL-MCNC: 6.3 G/DL — SIGNIFICANT CHANGE UP (ref 6–8.3)
RBC # BLD: 4.24 M/UL — SIGNIFICANT CHANGE UP (ref 4.2–5.8)
RBC # FLD: 13.7 % — SIGNIFICANT CHANGE UP (ref 10.3–14.5)
SODIUM SERPL-SCNC: 142 MMOL/L — SIGNIFICANT CHANGE UP (ref 135–145)
WBC # BLD: 5.36 K/UL — SIGNIFICANT CHANGE UP (ref 3.8–10.5)
WBC # FLD AUTO: 5.36 K/UL — SIGNIFICANT CHANGE UP (ref 3.8–10.5)

## 2020-05-25 RX ADMIN — Medication 20 MILLIGRAM(S): at 05:18

## 2020-05-25 RX ADMIN — Medication 1 MILLIGRAM(S): at 12:20

## 2020-05-25 RX ADMIN — POLYETHYLENE GLYCOL 3350 17 GRAM(S): 17 POWDER, FOR SOLUTION ORAL at 12:21

## 2020-05-25 RX ADMIN — Medication 25 MICROGRAM(S): at 05:18

## 2020-05-25 RX ADMIN — SERTRALINE 25 MILLIGRAM(S): 25 TABLET, FILM COATED ORAL at 12:20

## 2020-05-25 RX ADMIN — ENOXAPARIN SODIUM 40 MILLIGRAM(S): 100 INJECTION SUBCUTANEOUS at 12:20

## 2020-05-25 NOTE — PROGRESS NOTE ADULT - SUBJECTIVE AND OBJECTIVE BOX
SUBJECTIVE / OVERNIGHT EVENTS: pt seen and examined    MEDICATIONS  (STANDING):  enoxaparin Injectable 40 milliGRAM(s) SubCutaneous daily  folic acid 1 milliGRAM(s) Oral daily  furosemide    Tablet 20 milliGRAM(s) Oral daily  levothyroxine 25 MICROGram(s) Oral daily  polyethylene glycol 3350 17 Gram(s) Oral daily  sertraline 25 milliGRAM(s) Oral daily    MEDICATIONS  (PRN):  acetaminophen   Tablet .. 650 milliGRAM(s) Oral every 6 hours PRN Temp greater or equal to 38C (100.4F), Mild Pain (1 - 3)  ALBUTerol    90 MICROgram(s) HFA Inhaler 2 Puff(s) Inhalation every 6 hours PRN Shortness of Breath and/or Wheezing  guaiFENesin   Syrup  (Sugar-Free) 100 milliGRAM(s) Oral every 6 hours PRN Cough    Vital Signs Last 24 Hrs  T(C): 36.2 (25 May 2020 20:25), Max: 36.4 (25 May 2020 04:58)  T(F): 97.2 (25 May 2020 20:25), Max: 97.6 (25 May 2020 04:58)  HR: 65 (25 May 2020 20:25) (50 - 65)  BP: 122/71 (25 May 2020 20:25) (122/56 - 135/65)  BP(mean): --  RR: 16 (25 May 2020 20:25) (14 - 16)  SpO2: 97% (25 May 2020 20:25) (97% - 100%)    GENERAL: NAD  EYES: EOMI, PERRLA  NECK: Supple, No JVD  CHEST/LUNG: dec breath sounds at bases  HEART:  S1 , S2 +  ABDOMEN: soft , bs+  EXTREMITIES:  no edema  NEUROLOGY:alert awake     LABS:  05-25    142  |  108  |  22<H>  ----------------------------<  72  3.9   |  26  |  1.03    Ca    9.2      25 May 2020 06:11    TPro  6.3  /  Alb  2.8<L>  /  TBili  0.3  /  DBili      /  AST  16  /  ALT  15  /  AlkPhos  98  05-25    Creatinine Trend: 1.03 <--, 1.00 <--, 0.98 <--, 1.00 <--, 1.04 <--, 1.04 <--, 1.03 <--                        12.8   5.36  )-----------( 166      ( 25 May 2020 06:11 )             38.8     Urine Studies:            LIVER FUNCTIONS - ( 25 May 2020 06:11 )  Alb: 2.8 g/dL / Pro: 6.3 g/dL / ALK PHOS: 98 U/L / ALT: 15 U/L DA / AST: 16 U/L / GGT: x                   RADIOLOGY & ADDITIONAL TESTS:    Imaging Personally Reviewed:    Consultant(s) Notes Reviewed:      Care Discussed with Consultants/Other Providers:

## 2020-05-26 LAB — SARS-COV-2 RNA SPEC QL NAA+PROBE: DETECTED

## 2020-05-26 RX ADMIN — POLYETHYLENE GLYCOL 3350 17 GRAM(S): 17 POWDER, FOR SOLUTION ORAL at 11:48

## 2020-05-26 NOTE — PROGRESS NOTE ADULT - SUBJECTIVE AND OBJECTIVE BOX
NP Note discussed with  Primary Attending      84 year old male with PMHx of dementia, depression, frequent falls, and hypothyroidism and no pertinent PSHx presents to the ED from [facility] after testing positive for COVID-19 yesterday. Patient is admitted for Covid 19, not requiring oxygen support.  PAtient was examined in Bed. Just finished his breakfast. offers no complaints. he is stable for discharge however needs two negative COVID19 tests prior to discharge, had a positive result 5/26  Pt. is asymptomatic, HD stable .    INTERVAL HPI/OVERNIGHT EVENTS: no new complaints. states he is doing as "always". does not wish to elaborate.   MEDICATIONS  (STANDING):  enoxaparin Injectable 40 milliGRAM(s) SubCutaneous daily  folic acid 1 milliGRAM(s) Oral daily  furosemide    Tablet 20 milliGRAM(s) Oral daily  levothyroxine 25 MICROGram(s) Oral daily  polyethylene glycol 3350 17 Gram(s) Oral daily  sertraline 25 milliGRAM(s) Oral daily    MEDICATIONS  (PRN):  acetaminophen   Tablet .. 650 milliGRAM(s) Oral every 6 hours PRN Temp greater or equal to 38C (100.4F), Mild Pain (1 - 3)  ALBUTerol    90 MICROgram(s) HFA Inhaler 2 Puff(s) Inhalation every 6 hours PRN Shortness of Breath and/or Wheezing  guaiFENesin   Syrup  (Sugar-Free) 100 milliGRAM(s) Oral every 6 hours PRN Cough      __________________________________________________  REVIEW OF SYSTEMS:    CONSTITUTIONAL: No fever,   EYES: no acute visual disturbances  NECK: No pain or stiffness  RESPIRATORY: No cough; No shortness of breath  CARDIOVASCULAR: No chest pain, no palpitations  GASTROINTESTINAL: No pain. No nausea or vomiting; No diarrhea   NEUROLOGICAL: No headache or numbness, no tremors  MUSCULOSKELETAL: No joint pain, no muscle pain  GENITOURINARY: no dysuria, no frequency, no hesitancy  PSYCHIATRY: no depression , no anxiety  ALL OTHER  ROS negative        Vital Signs Last 24 Hrs  T(C): 36.2 (26 May 2020 05:25), Max: 36.2 (25 May 2020 12:57)  T(F): 97.2 (26 May 2020 05:25), Max: 97.2 (25 May 2020 12:57)  HR: 52 (26 May 2020 05:25) (52 - 65)  BP: 130/62 (26 May 2020 05:25) (122/56 - 130/62)  BP(mean): --  RR: 16 (26 May 2020 05:25) (14 - 16)  SpO2: 100% (26 May 2020 05:25) (97% - 100%)    ________________________________________________  PHYSICAL EXAM:  GENERAL: NAD  HEENT: Normocephalic;  conjunctivae and sclerae clear; moist mucous membranes;   NECK : supple  CHEST/LUNG: Clear to auscultation bilaterally with good air entry   HEART: S1 S2  regular; no murmurs, gallops or rubs  ABDOMEN: Soft, Nontender, Nondistended; Bowel sounds present  EXTREMITIES: no cyanosis; no edema; no calf tenderness  SKIN: warm and dry; no rash  NERVOUS SYSTEM:  Awake and alert; Oriented  to place, person and time ; no new deficits    _________________________________________________  LABS:                        12.8   5.36  )-----------( 166      ( 25 May 2020 06:11 )             38.8     05-25    142  |  108  |  22<H>  ----------------------------<  72  3.9   |  26  |  1.03    Ca    9.2      25 May 2020 06:11    TPro  6.3  /  Alb  2.8<L>  /  TBili  0.3  /  DBili  x   /  AST  16  /  ALT  15  /  AlkPhos  98  05-25        CAPILLARY BLOOD GLUCOSE            RADIOLOGY & ADDITIONAL TESTS:    Imaging  Reviewed:  YES/NO    Consultant(s) Notes Reviewed:   YES/ No      Plan of care was discussed with patient and /or primary care giver; all questions and concerns were addressed

## 2020-05-27 RX ADMIN — Medication 20 MILLIGRAM(S): at 05:46

## 2020-05-27 RX ADMIN — Medication 25 MICROGRAM(S): at 05:46

## 2020-05-27 NOTE — PROGRESS NOTE ADULT - SUBJECTIVE AND OBJECTIVE BOX
NP Note discussed with  Primary Attending    84 year old male with PMHx of dementia, depression, frequent falls, and hypothyroidism and no pertinent PSHx presents to the ED from [facility] after testing positive for COVID-19 yesterday. Patient is admitted for Covid 19, not requiring oxygen support.  PAtient was examined in Bed. Just finished his breakfast. offers no complaints. he is stable for discharge however needs two negative COVID19 tests prior to discharge, had a positive result 5/26  Pt. is asymptomatic, HD stable .        INTERVAL HPI/OVERNIGHT EVENTS: no new complaints. saturating at 100 % on room air. wants to go back to [facility]. He needs to be tested negative for COVID x2 before discharge. last test was on 05/26/2020.  needs to be retested on 05/29.  refused AM blood work.  05/26/ results were normal     MEDICATIONS  (STANDING):  enoxaparin Injectable 40 milliGRAM(s) SubCutaneous daily  folic acid 1 milliGRAM(s) Oral daily  furosemide    Tablet 20 milliGRAM(s) Oral daily  levothyroxine 25 MICROGram(s) Oral daily  polyethylene glycol 3350 17 Gram(s) Oral daily  sertraline 25 milliGRAM(s) Oral daily    MEDICATIONS  (PRN):  acetaminophen   Tablet .. 650 milliGRAM(s) Oral every 6 hours PRN Temp greater or equal to 38C (100.4F), Mild Pain (1 - 3)  ALBUTerol    90 MICROgram(s) HFA Inhaler 2 Puff(s) Inhalation every 6 hours PRN Shortness of Breath and/or Wheezing  guaiFENesin   Syrup  (Sugar-Free) 100 milliGRAM(s) Oral every 6 hours PRN Cough      __________________________________________________  REVIEW OF SYSTEMS:    CONSTITUTIONAL: No fever,   EYES: no acute visual disturbances  NECK: No pain or stiffness  RESPIRATORY: No cough; No shortness of breath  CARDIOVASCULAR: No chest pain, no palpitations  GASTROINTESTINAL: No pain. No nausea or vomiting; No diarrhea   NEUROLOGICAL: No headache or numbness, no tremors  MUSCULOSKELETAL: No joint pain, no muscle pain  GENITOURINARY: no dysuria, no frequency, no hesitancy  PSYCHIATRY: no depression , no anxiety  ALL OTHER  ROS negative        Vital Signs Last 24 Hrs  T(C): 36.3 (27 May 2020 05:45), Max: 36.6 (26 May 2020 14:05)  T(F): 97.3 (27 May 2020 05:45), Max: 97.9 (26 May 2020 14:05)  HR: 50 (27 May 2020 05:45) (50 - 70)  BP: 132/68 (27 May 2020 05:45) (117/61 - 137/62)  BP(mean): --  RR: 16 (27 May 2020 05:45) (16 - 17)  SpO2: 100% (27 May 2020 05:45) (96% - 100%)    ________________________________________________  PHYSICAL EXAM:  GENERAL: NAD  HEENT: Normocephalic;  conjunctivae and sclerae clear; moist mucous membranes;   NECK : supple  CHEST/LUNG: Clear to auscultation bilaterally with good air entry   HEART: S1 S2  regular; no murmurs, gallops or rubs  ABDOMEN: Soft, Nontender, Nondistended; Bowel sounds present  EXTREMITIES: no cyanosis; no edema; no calf tenderness  SKIN: warm and dry; no rash  NERVOUS SYSTEM:  Awake and alert; Oriented  to place, person and time ; no new deficits    _________________________________________________  LABS:              CAPILLARY BLOOD GLUCOSE            RADIOLOGY & ADDITIONAL TESTS:    Imaging  Reviewed:  YES/NO    Consultant(s) Notes Reviewed:   YES/ No      Plan of care was discussed with patient and /or primary care giver; all questions and concerns were addressed

## 2020-05-28 LAB
ANION GAP SERPL CALC-SCNC: 8 MMOL/L — SIGNIFICANT CHANGE UP (ref 5–17)
BUN SERPL-MCNC: 23 MG/DL — HIGH (ref 7–18)
CALCIUM SERPL-MCNC: 9 MG/DL — SIGNIFICANT CHANGE UP (ref 8.4–10.5)
CHLORIDE SERPL-SCNC: 106 MMOL/L — SIGNIFICANT CHANGE UP (ref 96–108)
CO2 SERPL-SCNC: 26 MMOL/L — SIGNIFICANT CHANGE UP (ref 22–31)
CREAT SERPL-MCNC: 1.06 MG/DL — SIGNIFICANT CHANGE UP (ref 0.5–1.3)
GLUCOSE SERPL-MCNC: 75 MG/DL — SIGNIFICANT CHANGE UP (ref 70–99)
HCT VFR BLD CALC: 39 % — SIGNIFICANT CHANGE UP (ref 39–50)
HGB BLD-MCNC: 12.9 G/DL — LOW (ref 13–17)
MCHC RBC-ENTMCNC: 30.1 PG — SIGNIFICANT CHANGE UP (ref 27–34)
MCHC RBC-ENTMCNC: 33.1 GM/DL — SIGNIFICANT CHANGE UP (ref 32–36)
MCV RBC AUTO: 91.1 FL — SIGNIFICANT CHANGE UP (ref 80–100)
NRBC # BLD: 0 /100 WBCS — SIGNIFICANT CHANGE UP (ref 0–0)
PLATELET # BLD AUTO: 172 K/UL — SIGNIFICANT CHANGE UP (ref 150–400)
POTASSIUM SERPL-MCNC: 3.8 MMOL/L — SIGNIFICANT CHANGE UP (ref 3.5–5.3)
POTASSIUM SERPL-SCNC: 3.8 MMOL/L — SIGNIFICANT CHANGE UP (ref 3.5–5.3)
RBC # BLD: 4.28 M/UL — SIGNIFICANT CHANGE UP (ref 4.2–5.8)
RBC # FLD: 14 % — SIGNIFICANT CHANGE UP (ref 10.3–14.5)
SODIUM SERPL-SCNC: 140 MMOL/L — SIGNIFICANT CHANGE UP (ref 135–145)
WBC # BLD: 6.43 K/UL — SIGNIFICANT CHANGE UP (ref 3.8–10.5)
WBC # FLD AUTO: 6.43 K/UL — SIGNIFICANT CHANGE UP (ref 3.8–10.5)

## 2020-05-28 RX ADMIN — Medication 20 MILLIGRAM(S): at 05:55

## 2020-05-28 RX ADMIN — Medication 25 MICROGRAM(S): at 05:55

## 2020-05-28 RX ADMIN — ENOXAPARIN SODIUM 40 MILLIGRAM(S): 100 INJECTION SUBCUTANEOUS at 11:56

## 2020-05-28 RX ADMIN — Medication 1 MILLIGRAM(S): at 11:56

## 2020-05-28 RX ADMIN — SERTRALINE 25 MILLIGRAM(S): 25 TABLET, FILM COATED ORAL at 11:56

## 2020-05-28 NOTE — CHART NOTE - NSCHARTNOTEFT_GEN_A_CORE
Assessment:   84yMalePatient is a 84y old  Male who presents with a chief complaint of Covid + (28 May 2020 09:47)  Pt W covid + On airborne isolation.  Observed Lunch meal Po intake  ~25 % if meal. Pt may benefit from Finger food. Will  provide Nutrient dense snacks W meal. D/W RN. Labs Noted      Factors impacting intake: [ ] none [ ] nausea  [ ] vomiting [ ] diarrhea [ ] constipation  [ ]chewing problems [ ] swallowing issues  [ ] other:     Diet Presciption: Diet, Regular (05-15-20 @ 16:37)    Intake: < 50 % OF meal     Current Weight:   % Weight Change    Pertinent Medications: MEDICATIONS  (STANDING):  enoxaparin Injectable 40 milliGRAM(s) SubCutaneous daily  folic acid 1 milliGRAM(s) Oral daily  furosemide    Tablet 20 milliGRAM(s) Oral daily  levothyroxine 25 MICROGram(s) Oral daily  polyethylene glycol 3350 17 Gram(s) Oral daily  sertraline 25 milliGRAM(s) Oral daily    MEDICATIONS  (PRN):  acetaminophen   Tablet .. 650 milliGRAM(s) Oral every 6 hours PRN Temp greater or equal to 38C (100.4F), Mild Pain (1 - 3)  ALBUTerol    90 MICROgram(s) HFA Inhaler 2 Puff(s) Inhalation every 6 hours PRN Shortness of Breath and/or Wheezing  guaiFENesin   Syrup  (Sugar-Free) 100 milliGRAM(s) Oral every 6 hours PRN Cough    Pertinent Labs: 05-28 Na140 mmol/L Glu 75 mg/dL K+ 3.8 mmol/L Cr  1.06 mg/dL BUN 23 mg/dL<H> 05-22 Phos 2.3 mg/dL<L> 05-25 Alb 2.8 g/dL<L> 05-16 Chol 232 mg/dL<H>  mg/dL HDL 46 mg/dL Trig 176 mg/dL<H>     CAPILLARY BLOOD GLUCOSE        Skin:     Estimated Needs:   [ ] no change since previous assessment  [ ] recalculated:     Previous Nutrition Diagnosis:   [ ] Inadequate Energy Intake [x ]Inadequate Oral Intake [ ] Excessive Energy Intake   [ ] Underweight [ ] Increased Nutrient Needs [ ] Overweight/Obesity   [ ] Altered GI Function [ ] Unintended Weight Loss [ ] Food & Nutrition Related Knowledge Deficit [ ] Malnutrition     Nutrition Diagnosis is [x ] ongoing  [ ] resolved [ ] not applicable     New Nutrition Diagnosis: [ ] not applicable       Interventions:   Recommend  [ ] Change Diet To:  [ ] Nutrition Supplement  [ ] Nutrition Support  [x ] Other:  Provide Nutrient dense Snacks W meals.  (x) Ensure enlive BID.     Monitoring and Evaluation:   [ ] PO intake [ x ] Tolerance to diet prescription [ x ] weights [ x ] labs[ x ] follow up per protocol  [ ] other:

## 2020-05-28 NOTE — PROGRESS NOTE ADULT - SUBJECTIVE AND OBJECTIVE BOX
NP Note discussed with  Primary Attending    Patient is a 84y old  Male who presents with a chief complaint of Covid + (27 May 2020 12:00)    HPI- 4 year old male with PMHx of dementia, depression, frequent falls, and hypothyroidism and no pertinent PSHx presents to the ED from [facility] after testing positive for COVID-19 yesterday. Patient is admitted for Covid 19, not requiring oxygen support.  PAtient was examined in Bed. Just finished his breakfast. offers no complaints. he is stable for discharge however needs two negative COVID19 tests prior to discharge, had a positive result 5/26  Pt. is asymptomatic, HD stable.   Discharge plan to [facility]. requires 2 negative COVID PCR result before discharge. Last COVID PCR test was done on 5/26 resulted positive.  plan to repeat on 5/29.     INTERVAL HPI/OVERNIGHT EVENTS: seen at bedside. resting comfortable on room air. Saturating at  97 % on room air.       MEDICATIONS  (STANDING):  enoxaparin Injectable 40 milliGRAM(s) SubCutaneous daily  folic acid 1 milliGRAM(s) Oral daily  furosemide    Tablet 20 milliGRAM(s) Oral daily  levothyroxine 25 MICROGram(s) Oral daily  polyethylene glycol 3350 17 Gram(s) Oral daily  sertraline 25 milliGRAM(s) Oral daily    MEDICATIONS  (PRN):  acetaminophen   Tablet .. 650 milliGRAM(s) Oral every 6 hours PRN Temp greater or equal to 38C (100.4F), Mild Pain (1 - 3)  ALBUTerol    90 MICROgram(s) HFA Inhaler 2 Puff(s) Inhalation every 6 hours PRN Shortness of Breath and/or Wheezing  guaiFENesin   Syrup  (Sugar-Free) 100 milliGRAM(s) Oral every 6 hours PRN Cough      __________________________________________________  REVIEW OF SYSTEMS:    CONSTITUTIONAL: No fever,   EYES: no acute visual disturbances  NECK: No pain or stiffness  RESPIRATORY: No cough; No shortness of breath  CARDIOVASCULAR: No chest pain, no palpitations  GASTROINTESTINAL: No pain. No nausea or vomiting; No diarrhea   NEUROLOGICAL: No headache or numbness, no tremors  MUSCULOSKELETAL: No joint pain, no muscle pain  GENITOURINARY: no dysuria, no frequency, no hesitancy  PSYCHIATRY: no depression , no anxiety  ALL OTHER  ROS negative        Vital Signs Last 24 Hrs  T(C): 36.4 (28 May 2020 05:00), Max: 36.4 (27 May 2020 12:27)  T(F): 97.6 (28 May 2020 05:00), Max: 97.6 (27 May 2020 12:27)  HR: 56 (28 May 2020 05:00) (56 - 73)  BP: 131/54 (28 May 2020 05:00) (113/58 - 131/54)  BP(mean): --  RR: 16 (28 May 2020 05:00) (14 - 16)  SpO2: 100% (28 May 2020 05:00) (100% - 100%)    ________________________________________________  PHYSICAL EXAM:  GENERAL: NAD. Conversant. confused at times during exam  HEENT: Normocephalic;  conjunctivae and sclerae clear; moist mucous membranes;   NECK : supple  CHEST/LUNG: Clear to auscultation bilaterally with good air entry   HEART: S1 S2  regular; no murmurs, gallops or rubs  ABDOMEN: Soft, Nontender, Nondistended; Bowel sounds present  EXTREMITIES: no cyanosis; no edema; no calf tenderness  SKIN: warm and dry; no rash  NERVOUS SYSTEM:  Awake and alert; Oriented  to self and place    _________________________________________________  LABS:                        12.9   6.43  )-----------( 172      ( 28 May 2020 06:20 )             39.0     05-28    140  |  106  |  23<H>  ----------------------------<  75  3.8   |  26  |  1.06    Ca    9.0      28 May 2020 06:20          CAPILLARY BLOOD GLUCOSE            RADIOLOGY & ADDITIONAL TESTS:    Imaging  Reviewed:  YES  < from: Xray Chest 1 View- PORTABLE-Urgent (05.15.20 @ 15:26) >    EXAM:  XR CHEST PORTABLE URGENT 1V                            PROCEDURE DATE:  05/15/2020          INTERPRETATION:    Examination: XR CHEST URGENT    History: ADMDIAG1: U07.1 COVID-19 ACUTE RESPIRATORY DISEASE/, covid +    Comparison: 5/14/2020 and3/22/2019    Findings: Normal heart size. Clear lungs. No pneumothorax or pleural effusion. Air over the upper abdomen is likely in the patient's high positioned transverse colon.      Impression:  1.  No active pulmonary disease.      DISCRETE X-RAYDATA:  Percent of LEFT lung opacification: Clear  Percent of RIGHT lung opacification: Clear  Change in lung opacification from most recent x-ray (<=3 days): Stable  Change from prior dated 3 or more days (same admission): No Prior                  SUMA COSTA M.D., ATTENDING RADIOLOGIST  This document has been electronically signed. May 15 2020  3:37PM                < end of copied text >      Consultant(s) Notes Reviewed:   YES      Plan of care was discussed with patient and /or primary care giver; all questions and concerns were addressed NP Note discussed with  Primary Attending    Patient is a 84y old  Male who presents with a chief complaint of Covid + (27 May 2020 12:00)    HPI- 4 year old male with PMHx of dementia, depression, frequent falls, and hypothyroidism and no pertinent PSHx presents to the ED from [facility] after testing positive for COVID-19. Patient is admitted for Covid 19, not requiring oxygen support.  PAtient was examined in Bed. Just finished his breakfast. offers no complaints. he is stable for discharge however needs two negative COVID19 tests prior to discharge, had a positive result 5/26  Pt. is asymptomatic, HD stable.   Discharge plan to [facility]. requires 2 negative COVID PCR result before discharge. Last COVID PCR test was done on 5/26 resulted positive.  plan to repeat on 5/29.     INTERVAL HPI/OVERNIGHT EVENTS: seen at bedside. resting comfortable on room air. Saturating at  97 % on room air.       MEDICATIONS  (STANDING):  enoxaparin Injectable 40 milliGRAM(s) SubCutaneous daily  folic acid 1 milliGRAM(s) Oral daily  furosemide    Tablet 20 milliGRAM(s) Oral daily  levothyroxine 25 MICROGram(s) Oral daily  polyethylene glycol 3350 17 Gram(s) Oral daily  sertraline 25 milliGRAM(s) Oral daily    MEDICATIONS  (PRN):  acetaminophen   Tablet .. 650 milliGRAM(s) Oral every 6 hours PRN Temp greater or equal to 38C (100.4F), Mild Pain (1 - 3)  ALBUTerol    90 MICROgram(s) HFA Inhaler 2 Puff(s) Inhalation every 6 hours PRN Shortness of Breath and/or Wheezing  guaiFENesin   Syrup  (Sugar-Free) 100 milliGRAM(s) Oral every 6 hours PRN Cough      __________________________________________________  REVIEW OF SYSTEMS:    CONSTITUTIONAL: No fever,   EYES: no acute visual disturbances  NECK: No pain or stiffness  RESPIRATORY: No cough; No shortness of breath  CARDIOVASCULAR: No chest pain, no palpitations  GASTROINTESTINAL: No pain. No nausea or vomiting; No diarrhea   NEUROLOGICAL: No headache or numbness, no tremors  MUSCULOSKELETAL: No joint pain, no muscle pain  GENITOURINARY: no dysuria, no frequency, no hesitancy  PSYCHIATRY: no depression , no anxiety  ALL OTHER  ROS negative        Vital Signs Last 24 Hrs  T(C): 36.4 (28 May 2020 05:00), Max: 36.4 (27 May 2020 12:27)  T(F): 97.6 (28 May 2020 05:00), Max: 97.6 (27 May 2020 12:27)  HR: 56 (28 May 2020 05:00) (56 - 73)  BP: 131/54 (28 May 2020 05:00) (113/58 - 131/54)  BP(mean): --  RR: 16 (28 May 2020 05:00) (14 - 16)  SpO2: 100% (28 May 2020 05:00) (100% - 100%)    ________________________________________________  PHYSICAL EXAM:  GENERAL: NAD. Conversant. confused at times during exam  HEENT: Normocephalic;  conjunctivae and sclerae clear; moist mucous membranes;   NECK : supple  CHEST/LUNG: Clear to auscultation bilaterally with good air entry   HEART: S1 S2  regular; no murmurs, gallops or rubs  ABDOMEN: Soft, Nontender, Nondistended; Bowel sounds present  EXTREMITIES: no cyanosis; no edema; no calf tenderness  SKIN: warm and dry; no rash  NERVOUS SYSTEM:  Awake and alert; Oriented  to self and place    _________________________________________________  LABS:                        12.9   6.43  )-----------( 172      ( 28 May 2020 06:20 )             39.0     05-28    140  |  106  |  23<H>  ----------------------------<  75  3.8   |  26  |  1.06    Ca    9.0      28 May 2020 06:20          CAPILLARY BLOOD GLUCOSE            RADIOLOGY & ADDITIONAL TESTS:    Imaging  Reviewed:  YES  < from: Xray Chest 1 View- PORTABLE-Urgent (05.15.20 @ 15:26) >    EXAM:  XR CHEST PORTABLE URGENT 1V                            PROCEDURE DATE:  05/15/2020          INTERPRETATION:    Examination: XR CHEST URGENT    History: ADMDIAG1: U07.1 COVID-19 ACUTE RESPIRATORY DISEASE/, covid +    Comparison: 5/14/2020 and3/22/2019    Findings: Normal heart size. Clear lungs. No pneumothorax or pleural effusion. Air over the upper abdomen is likely in the patient's high positioned transverse colon.      Impression:  1.  No active pulmonary disease.      DISCRETE X-RAYDATA:  Percent of LEFT lung opacification: Clear  Percent of RIGHT lung opacification: Clear  Change in lung opacification from most recent x-ray (<=3 days): Stable  Change from prior dated 3 or more days (same admission): No Prior                  SUMA COSTA M.D., ATTENDING RADIOLOGIST  This document has been electronically signed. May 15 2020  3:37PM                < end of copied text >      Consultant(s) Notes Reviewed:   YES      Plan of care was discussed with patient and /or primary care giver; all questions and concerns were addressed

## 2020-05-29 DIAGNOSIS — Z29.9 ENCOUNTER FOR PROPHYLACTIC MEASURES, UNSPECIFIED: ICD-10-CM

## 2020-05-29 LAB
ALBUMIN SERPL ELPH-MCNC: 3 G/DL — LOW (ref 3.5–5)
ALP SERPL-CCNC: 115 U/L — SIGNIFICANT CHANGE UP (ref 40–120)
ALT FLD-CCNC: 15 U/L DA — SIGNIFICANT CHANGE UP (ref 10–60)
ANION GAP SERPL CALC-SCNC: 7 MMOL/L — SIGNIFICANT CHANGE UP (ref 5–17)
AST SERPL-CCNC: 16 U/L — SIGNIFICANT CHANGE UP (ref 10–40)
BASOPHILS # BLD AUTO: 0.05 K/UL — SIGNIFICANT CHANGE UP (ref 0–0.2)
BASOPHILS NFR BLD AUTO: 0.8 % — SIGNIFICANT CHANGE UP (ref 0–2)
BILIRUB SERPL-MCNC: 0.4 MG/DL — SIGNIFICANT CHANGE UP (ref 0.2–1.2)
BUN SERPL-MCNC: 25 MG/DL — HIGH (ref 7–18)
CALCIUM SERPL-MCNC: 9 MG/DL — SIGNIFICANT CHANGE UP (ref 8.4–10.5)
CHLORIDE SERPL-SCNC: 104 MMOL/L — SIGNIFICANT CHANGE UP (ref 96–108)
CO2 SERPL-SCNC: 28 MMOL/L — SIGNIFICANT CHANGE UP (ref 22–31)
CREAT SERPL-MCNC: 0.96 MG/DL — SIGNIFICANT CHANGE UP (ref 0.5–1.3)
EOSINOPHIL # BLD AUTO: 0.25 K/UL — SIGNIFICANT CHANGE UP (ref 0–0.5)
EOSINOPHIL NFR BLD AUTO: 4.1 % — SIGNIFICANT CHANGE UP (ref 0–6)
GLUCOSE SERPL-MCNC: 72 MG/DL — SIGNIFICANT CHANGE UP (ref 70–99)
HCT VFR BLD CALC: 39.8 % — SIGNIFICANT CHANGE UP (ref 39–50)
HGB BLD-MCNC: 13 G/DL — SIGNIFICANT CHANGE UP (ref 13–17)
IMM GRANULOCYTES NFR BLD AUTO: 0.7 % — SIGNIFICANT CHANGE UP (ref 0–1.5)
LYMPHOCYTES # BLD AUTO: 1.83 K/UL — SIGNIFICANT CHANGE UP (ref 1–3.3)
LYMPHOCYTES # BLD AUTO: 29.9 % — SIGNIFICANT CHANGE UP (ref 13–44)
MCHC RBC-ENTMCNC: 29.6 PG — SIGNIFICANT CHANGE UP (ref 27–34)
MCHC RBC-ENTMCNC: 32.7 GM/DL — SIGNIFICANT CHANGE UP (ref 32–36)
MCV RBC AUTO: 90.7 FL — SIGNIFICANT CHANGE UP (ref 80–100)
MONOCYTES # BLD AUTO: 0.68 K/UL — SIGNIFICANT CHANGE UP (ref 0–0.9)
MONOCYTES NFR BLD AUTO: 11.1 % — SIGNIFICANT CHANGE UP (ref 2–14)
NEUTROPHILS # BLD AUTO: 3.28 K/UL — SIGNIFICANT CHANGE UP (ref 1.8–7.4)
NEUTROPHILS NFR BLD AUTO: 53.4 % — SIGNIFICANT CHANGE UP (ref 43–77)
NRBC # BLD: 0 /100 WBCS — SIGNIFICANT CHANGE UP (ref 0–0)
PLATELET # BLD AUTO: 191 K/UL — SIGNIFICANT CHANGE UP (ref 150–400)
POTASSIUM SERPL-MCNC: 3.8 MMOL/L — SIGNIFICANT CHANGE UP (ref 3.5–5.3)
POTASSIUM SERPL-SCNC: 3.8 MMOL/L — SIGNIFICANT CHANGE UP (ref 3.5–5.3)
PROT SERPL-MCNC: 6.6 G/DL — SIGNIFICANT CHANGE UP (ref 6–8.3)
RBC # BLD: 4.39 M/UL — SIGNIFICANT CHANGE UP (ref 4.2–5.8)
RBC # FLD: 13.7 % — SIGNIFICANT CHANGE UP (ref 10.3–14.5)
SODIUM SERPL-SCNC: 139 MMOL/L — SIGNIFICANT CHANGE UP (ref 135–145)
WBC # BLD: 6.13 K/UL — SIGNIFICANT CHANGE UP (ref 3.8–10.5)
WBC # FLD AUTO: 6.13 K/UL — SIGNIFICANT CHANGE UP (ref 3.8–10.5)

## 2020-05-29 RX ORDER — POTASSIUM CHLORIDE 20 MEQ
20 PACKET (EA) ORAL ONCE
Refills: 0 | Status: COMPLETED | OUTPATIENT
Start: 2020-05-29 | End: 2020-05-29

## 2020-05-29 RX ADMIN — Medication 20 MILLIEQUIVALENT(S): at 11:56

## 2020-05-29 RX ADMIN — Medication 1 MILLIGRAM(S): at 11:55

## 2020-05-29 RX ADMIN — POLYETHYLENE GLYCOL 3350 17 GRAM(S): 17 POWDER, FOR SOLUTION ORAL at 11:55

## 2020-05-29 RX ADMIN — SERTRALINE 25 MILLIGRAM(S): 25 TABLET, FILM COATED ORAL at 11:56

## 2020-05-29 NOTE — PROGRESS NOTE ADULT - SUBJECTIVE AND OBJECTIVE BOX
NP Note discussed with  Primary Attending    Patient is a 84y old  Male who presents with a chief complaint of Covid + (28 May 2020 09:47)      INTERVAL HPI/OVERNIGHT EVENTS: No complaints.  Awaiting negative COVID results to be able to go to [facility].    MEDICATIONS  (STANDING):  enoxaparin Injectable 40 milliGRAM(s) SubCutaneous daily  folic acid 1 milliGRAM(s) Oral daily  furosemide    Tablet 20 milliGRAM(s) Oral daily  levothyroxine 25 MICROGram(s) Oral daily  polyethylene glycol 3350 17 Gram(s) Oral daily  potassium chloride    Tablet ER 20 milliEquivalent(s) Oral once  sertraline 25 milliGRAM(s) Oral daily    MEDICATIONS  (PRN):  acetaminophen   Tablet .. 650 milliGRAM(s) Oral every 6 hours PRN Temp greater or equal to 38C (100.4F), Mild Pain (1 - 3)  ALBUTerol    90 MICROgram(s) HFA Inhaler 2 Puff(s) Inhalation every 6 hours PRN Shortness of Breath and/or Wheezing  guaiFENesin   Syrup  (Sugar-Free) 100 milliGRAM(s) Oral every 6 hours PRN Cough      __________________________________________________  REVIEW OF SYSTEMS:    CONSTITUTIONAL: No fever,   EYES: no acute visual disturbances  NECK: No pain or stiffness  RESPIRATORY: No cough; No shortness of breath  CARDIOVASCULAR: No chest pain, no palpitations  GASTROINTESTINAL: No pain. No nausea or vomiting; No diarrhea   NEUROLOGICAL: No headache or numbness, no tremors  MUSCULOSKELETAL: No joint pain, no muscle pain  GENITOURINARY: no dysuria, no frequency, no hesitancy  PSYCHIATRY: no depression , no anxiety  ALL OTHER  ROS negative        Vital Signs Last 24 Hrs  T(C): 36.3 (29 May 2020 04:55), Max: 36.6 (28 May 2020 12:18)  T(F): 97.4 (29 May 2020 04:55), Max: 97.9 (28 May 2020 12:18)  HR: 54 (29 May 2020 04:55) (54 - 61)  BP: 126/60 (29 May 2020 04:55) (110/64 - 131/71)  BP(mean): --  RR: 16 (29 May 2020 04:55) (14 - 16)  SpO2: 100% (29 May 2020 04:55) (99% - 100%)    ________________________________________________  PHYSICAL EXAM:  GENERAL: NAD  HEENT: Normocephalic;  conjunctivae and sclerae clear; moist mucous membranes;   NECK : supple  CHEST/LUNG: Clear to auscultation bilaterally with good air entry   HEART: S1 S2  regular; no murmurs, gallops or rubs  ABDOMEN: Soft, Nontender, Nondistended; Bowel sounds present  EXTREMITIES: no cyanosis; no edema; no calf tenderness  SKIN: warm and dry; no rash  NERVOUS SYSTEM:  Awake and alert; Oriented to self and place.    _________________________________________________  LABS:                        13.0   6.13  )-----------( 191      ( 29 May 2020 06:58 )             39.8     05-29    139  |  104  |  25<H>  ----------------------------<  72  3.8   |  28  |  0.96    Ca    9.0      29 May 2020 06:58    TPro  6.6  /  Alb  3.0<L>  /  TBili  0.4  /  DBili  x   /  AST  16  /  ALT  15  /  AlkPhos  115  05-29        CAPILLARY BLOOD GLUCOSE            RADIOLOGY & ADDITIONAL TESTS:    Imaging Personally Reviewed:  YES  < from: Xray Chest 1 View- PORTABLE-Urgent (05.15.20 @ 15:26) >  Findings: Normal heart size. Clear lungs. No pneumothorax or pleural effusion. Air over the upper abdomen is likely in the patient's high positioned transverse colon.      Impression:  1.  No active pulmonary disease.      < end of copied text >      Consultant(s) Notes Reviewed:   YES    Care Discussed with Consultants :     Plan of care was discussed with patient and /or primary care giver; all questions and concerns were addressed and care was aligned with patient's wishes.

## 2020-05-30 LAB — SARS-COV-2 RNA SPEC QL NAA+PROBE: DETECTED

## 2020-05-30 RX ADMIN — Medication 25 MICROGRAM(S): at 05:29

## 2020-05-30 RX ADMIN — Medication 20 MILLIGRAM(S): at 05:29

## 2020-05-30 NOTE — PROGRESS NOTE ADULT - SUBJECTIVE AND OBJECTIVE BOX
Covering for Dr DEMARCUS Mccurdy  Patient seen       PRESENTING CCJ: Awake no change in condition Afebrile    PMH -reviewed admission note, no change since admission  Heart failure: acute [ ] chronic [ ] acute or chronic [ ] diastolic [ ] systolic [ ] combined systolic and diastolic[ ]  PAPO: ATN[ ] renal medullary necrosis [ ] CKD I [ ]CKDII [ ]CKD III [ ]CKD IV [ ]CKD V [ ]Other pathological lesions [ ]    MEDICATIONS  (STANDING):  enoxaparin Injectable 40 milliGRAM(s) SubCutaneous daily  folic acid 1 milliGRAM(s) Oral daily  furosemide    Tablet 20 milliGRAM(s) Oral daily  levothyroxine 25 MICROGram(s) Oral daily  polyethylene glycol 3350 17 Gram(s) Oral daily  sertraline 25 milliGRAM(s) Oral daily    MEDICATIONS  (PRN):  acetaminophen   Tablet .. 650 milliGRAM(s) Oral every 6 hours PRN Temp greater or equal to 38C (100.4F), Mild Pain (1 - 3)  ALBUTerol    90 MICROgram(s) HFA Inhaler 2 Puff(s) Inhalation every 6 hours PRN Shortness of Breath and/or Wheezing  guaiFENesin   Syrup  (Sugar-Free) 100 milliGRAM(s) Oral every 6 hours PRN Cough                Vital Signs Last 24 Hrs  T(C): 36.3 (30 May 2020 14:40), Max: 36.8 (29 May 2020 21:08)  T(F): 97.3 (30 May 2020 14:40), Max: 98.3 (29 May 2020 21:08)  HR: 58 (30 May 2020 14:40) (54 - 62)  BP: 111/58 (30 May 2020 14:40) (111/58 -   RR: 16 (30 May 2020 14:40) (16 - 16)  SpO2: 98% (30 May 2020 14:40) (98% - 100%)  I&O's Summary      PHYSICAL EXAM:  General: No acute distress BMI-  HEENT: EOMI, PERRL  Neck: Supple, [ ] JVD  Lungs: Equal air entry bilaterally; [ ] rales [ ] wheezing [ ] rhonchi  Heart: Regular rate and rhythm; [x ] murmur 2  /6 [x ] systolic [ ] diastolic [ ] radiation[ ] rubs [ ]  gallops  Abdomen: Nontender, bowel sounds present  Extremities: No clubbing, cyanosis, [ ] edema  Nervous system:  Alert & Oriented X1, no focal deficits  Psychiatric: Normal affect  Skin: No rashes or lesions    LABS:  05-29    139  |  104  |  25<H>  ----------------------------<  72  3.8   |  28  |  0.96    Ca    9.0      29 May 2020 06:58    TPro  6.6  /  Alb  3.0<L>  /  TBili  0.4  /  DBili  x   /  AST  16  /  ALT  15  /  AlkPhos  115  05-29    Creatinine Trend: 0.96<--, 1.06<--, 1.03<--, 1.00<--, 0.98<--, 1.00<--                        13.0   6.13  )-----------( 191      ( 29 May 2020 06:58 )             39.8       COVID-19 PCR . (05.29.20 @ 15:55)    COVID-19 PCR: Detected:        IMPRESSION AND PLAN:      Awaiting negative COVID test to go to [facility]      Problem/Plan - 1:  ·  Problem: COVID-19 virus detected.  Plan: Repeat COVID test. Was Positive on 5/29.  Monitor oxygen saturation.  Awaiting negative test for TOMASA.     Problem/Plan - 2:  ·  Problem: Dementia.  Plan: Maintain fall and safety measures.  Continue supportive care.     Problem/Plan - 3:  ·  Problem: Hypothyroid.  Plan: Continue synthroid.   Thyroid Stimulating Hormone, Serum (05.16.20 @ 08:27)    Thyroid Stimulating Hormone, Serum: 3.96 uU/mL        Problem/Plan - 4:  ·  Problem: Discharge planning issues.  Plan: Awaiting for negative COVID test to go to [facility].  Continue DVT prophylaxis.  Medically stable for discharge.     Problem/Plan - 5:  ·  Problem: Need for prophylactic measure.

## 2020-05-30 NOTE — PROGRESS NOTE ADULT - ATTENDING COMMENTS
Patient was seen and examined by me on 05/30/2020,interim events noted,labs and radiology studies reviewed.  Biju Baron MD,FACC.  [address] [place] [address].  [id number].  [phone]

## 2020-05-31 RX ADMIN — POLYETHYLENE GLYCOL 3350 17 GRAM(S): 17 POWDER, FOR SOLUTION ORAL at 12:02

## 2020-05-31 NOTE — PROGRESS NOTE ADULT - SUBJECTIVE AND OBJECTIVE BOX
COVERING FOR DR MCKEON        DATE OF SERVICE:Patient was seen and examined :05/31/2020    PRESENTING CC:    SUBJ:       PMH -reviewed admission note, no change since admission  Heart failure: acute [ ] chronic [ ] acute or chronic [ ] diastolic [ ] systolic [ ] combined systolic and diastolic[ ]  PAPO: ATN[ ] renal medullary necrosis [ ] CKD I [ ]CKDII [ ]CKD III [ ]CKD IV [ ]CKD V [ ]Other pathological lesions [ ]    MEDICATIONS  (STANDING):  enoxaparin Injectable 40 milliGRAM(s) SubCutaneous daily  folic acid 1 milliGRAM(s) Oral daily  furosemide    Tablet 20 milliGRAM(s) Oral daily  levothyroxine 25 MICROGram(s) Oral daily  polyethylene glycol 3350 17 Gram(s) Oral daily  sertraline 25 milliGRAM(s) Oral daily    MEDICATIONS  (PRN):  acetaminophen   Tablet .. 650 milliGRAM(s) Oral every 6 hours PRN Temp greater or equal to 38C (100.4F), Mild Pain (1 - 3)  ALBUTerol    90 MICROgram(s) HFA Inhaler 2 Puff(s) Inhalation every 6 hours PRN Shortness of Breath and/or Wheezing  guaiFENesin   Syrup  (Sugar-Free) 100 milliGRAM(s) Oral every 6 hours PRN Cough              REVIEW OF SYSTEMS:  Constitutional: [ ] fever, [ ]weight loss,  [ ]fatigue  Eyes: [ ] visual changes  Respiratory: [ ]shortness of breath;  [ ] cough, [ ]wheezing, [ ]chills, [ ]hemoptysis  Cardiovascular: [ ] chest pain, [ ]palpitations, [ ]dizziness,  [ ]leg swelling[ ]orthopnea[ ]PND  Gastrointestinal: [ ] abdominal pain, [ ]nausea, [ ]vomiting,  [ ]diarrhea   Genitourinary: [ ] dysuria, [ ] hematuria  Neurologic: [ ] headaches [ ] tremors[ ]weakness  Skin: [ ] itching, [ ]burning, [ ] rashes  Endocrine: [ ] heat or cold intolerance  Musculoskeletal: [ ] joint pain or swelling; [ ] muscle, back, or extremity pain  Psychiatric: [ ] depression, [ ]anxiety, [ ]mood swings, or [ ]difficulty sleeping  Hematologic: [ ] easy bruising, [ ] bleeding gums    [x] All remaining systems negative except as per above.   [ ]Unable to obtain.    Vital Signs Last 24 Hrs  T(C): 36.9 (31 May 2020 05:35), Max: 36.9 (31 May 2020 05:35)  T(F): 98.4 (31 May 2020 05:35), Max: 98.4 (31 May 2020 05:35)  HR: 62 (31 May 2020 05:35) (58 - 65)  BP: 117/78 (31 May 2020 05:35) (111/58 - 132/64)  BP(mean): --  RR: 16 (31 May 2020 05:35) (15 - 16)  SpO2: 99% (31 May 2020 05:35) (98% - 100%)  I&O's Summary      PHYSICAL EXAM:  General: No acute distress BMI-  HEENT: EOMI, PERRL  Neck: Supple, [ ] JVD  Lungs: Equal air entry bilaterally; [ ] rales [ ] wheezing [ ] rhonchi  Heart: Regular rate and rhythm; [ ] murmur   /6 [ ] systolic [ ] diastolic [ ] radiation[ ] rubs [ ]  gallops  Abdomen: Nontender, bowel sounds present  Extremities: No clubbing, cyanosis, [ ] edema  Nervous system:  Alert & Oriented X3, no focal deficits  Psychiatric: Normal affect  Skin: No rashes or lesions    LABS:        Creatinine Trend: 0.96<--, 1.06<--, 1.03<--, 1.00<--, 0.98<--, 1.00<--      Lipid Panel:   Cardiac Enzymes:           RADIOLOGY:    ECG [my interpretation]:    TELEMETRY:    ECHO:    STRESS TEST:    CATHETERIZATION:      IMPRESSION AND PLAN:

## 2020-06-01 NOTE — PROGRESS NOTE ADULT - SUBJECTIVE AND OBJECTIVE BOX
NP Note discussed with  Primary Attending    Patient is a 84y old  Male who presents with a chief complaint of Covid + (31 May 2020 10:55)      INTERVAL HPI/OVERNIGHT EVENTS: no new complaints    MEDICATIONS  (STANDING):  enoxaparin Injectable 40 milliGRAM(s) SubCutaneous daily  folic acid 1 milliGRAM(s) Oral daily  furosemide    Tablet 20 milliGRAM(s) Oral daily  levothyroxine 25 MICROGram(s) Oral daily  polyethylene glycol 3350 17 Gram(s) Oral daily  sertraline 25 milliGRAM(s) Oral daily    MEDICATIONS  (PRN):  acetaminophen   Tablet .. 650 milliGRAM(s) Oral every 6 hours PRN Temp greater or equal to 38C (100.4F), Mild Pain (1 - 3)  ALBUTerol    90 MICROgram(s) HFA Inhaler 2 Puff(s) Inhalation every 6 hours PRN Shortness of Breath and/or Wheezing  guaiFENesin   Syrup  (Sugar-Free) 100 milliGRAM(s) Oral every 6 hours PRN Cough      __________________________________________________  REVIEW OF SYSTEMS:    CONSTITUTIONAL: No fever,   EYES: no acute visual disturbances  NECK: No pain or stiffness  RESPIRATORY: No cough; No shortness of breath  CARDIOVASCULAR: No chest pain, no palpitations  GASTROINTESTINAL: No pain. No nausea or vomiting; No diarrhea   NEUROLOGICAL: No headache or numbness, no tremors  MUSCULOSKELETAL: No joint pain, no muscle pain  GENITOURINARY: no dysuria, no frequency, no hesitancy  PSYCHIATRY: no depression , no anxiety  ALL OTHER  ROS negative        Vital Signs Last 24 Hrs  T(C): 36.3 (01 Jun 2020 05:10), Max: 36.6 (31 May 2020 20:20)  T(F): 97.4 (01 Jun 2020 05:10), Max: 97.9 (31 May 2020 20:20)  HR: 49 (01 Jun 2020 05:10) (49 - 58)  BP: 124/54 (01 Jun 2020 05:10) (112/52 - 124/54)  BP(mean): --  RR: 16 (01 Jun 2020 05:10) (16 - 18)  SpO2: 100% (01 Jun 2020 05:10) (100% - 100%)    ________________________________________________  PHYSICAL EXAM:  GENERAL: NAD, thin  HEENT: Normocephalic;  conjunctivae and sclerae clear; moist mucous membranes;   NECK : supple. No JVD  CHEST/LUNG: Clear to auscultation bilaterally with good air entry   HEART: S1 S2  regular; no murmurs, gallops or rubs  ABDOMEN: Soft, Nontender, Nondistended; Bowel sounds present  EXTREMITIES: no cyanosis; no edema; no calf tenderness  SKIN: warm and dry; no rash  NERVOUS SYSTEM:  Awake and alert; Oriented X 2. Moving all extremities.    _________________________________________________  LABS:              CAPILLARY BLOOD GLUCOSE            RADIOLOGY & ADDITIONAL TESTS:    Imaging Personally Reviewed:  YES  < from: Xray Chest 1 View- PORTABLE-Urgent (05.15.20 @ 15:26) >  Findings: Normal heart size. Clear lungs. No pneumothorax or pleural effusion. Air over the upper abdomen is likely in the patient's high positioned transverse colon.      Impression:  1.  No active pulmonary disease.    < end of copied text >      Consultant(s) Notes Reviewed:   YES    Care Discussed with Consultants :     Plan of care was discussed with patient and /or primary care giver; all questions and concerns were addressed and care was aligned with patient's wishes.

## 2020-06-02 LAB
ALBUMIN SERPL ELPH-MCNC: 2.9 G/DL — LOW (ref 3.5–5)
ALP SERPL-CCNC: 104 U/L — SIGNIFICANT CHANGE UP (ref 40–120)
ALT FLD-CCNC: 16 U/L DA — SIGNIFICANT CHANGE UP (ref 10–60)
ANION GAP SERPL CALC-SCNC: 7 MMOL/L — SIGNIFICANT CHANGE UP (ref 5–17)
AST SERPL-CCNC: 19 U/L — SIGNIFICANT CHANGE UP (ref 10–40)
BILIRUB SERPL-MCNC: 0.4 MG/DL — SIGNIFICANT CHANGE UP (ref 0.2–1.2)
BUN SERPL-MCNC: 29 MG/DL — HIGH (ref 7–18)
CALCIUM SERPL-MCNC: 9.2 MG/DL — SIGNIFICANT CHANGE UP (ref 8.4–10.5)
CHLORIDE SERPL-SCNC: 105 MMOL/L — SIGNIFICANT CHANGE UP (ref 96–108)
CO2 SERPL-SCNC: 28 MMOL/L — SIGNIFICANT CHANGE UP (ref 22–31)
CREAT SERPL-MCNC: 1.01 MG/DL — SIGNIFICANT CHANGE UP (ref 0.5–1.3)
GLUCOSE SERPL-MCNC: 76 MG/DL — SIGNIFICANT CHANGE UP (ref 70–99)
HCT VFR BLD CALC: 41.3 % — SIGNIFICANT CHANGE UP (ref 39–50)
HGB BLD-MCNC: 13.5 G/DL — SIGNIFICANT CHANGE UP (ref 13–17)
MCHC RBC-ENTMCNC: 29.6 PG — SIGNIFICANT CHANGE UP (ref 27–34)
MCHC RBC-ENTMCNC: 32.7 GM/DL — SIGNIFICANT CHANGE UP (ref 32–36)
MCV RBC AUTO: 90.6 FL — SIGNIFICANT CHANGE UP (ref 80–100)
NRBC # BLD: 0 /100 WBCS — SIGNIFICANT CHANGE UP (ref 0–0)
PLATELET # BLD AUTO: 170 K/UL — SIGNIFICANT CHANGE UP (ref 150–400)
POTASSIUM SERPL-MCNC: 4.1 MMOL/L — SIGNIFICANT CHANGE UP (ref 3.5–5.3)
POTASSIUM SERPL-SCNC: 4.1 MMOL/L — SIGNIFICANT CHANGE UP (ref 3.5–5.3)
PROT SERPL-MCNC: 6.7 G/DL — SIGNIFICANT CHANGE UP (ref 6–8.3)
RBC # BLD: 4.56 M/UL — SIGNIFICANT CHANGE UP (ref 4.2–5.8)
RBC # FLD: 13.7 % — SIGNIFICANT CHANGE UP (ref 10.3–14.5)
SARS-COV-2 RNA SPEC QL NAA+PROBE: DETECTED
SODIUM SERPL-SCNC: 140 MMOL/L — SIGNIFICANT CHANGE UP (ref 135–145)
WBC # BLD: 6.6 K/UL — SIGNIFICANT CHANGE UP (ref 3.8–10.5)
WBC # FLD AUTO: 6.6 K/UL — SIGNIFICANT CHANGE UP (ref 3.8–10.5)

## 2020-06-02 RX ADMIN — Medication 25 MICROGRAM(S): at 05:36

## 2020-06-02 RX ADMIN — Medication 20 MILLIGRAM(S): at 05:36

## 2020-06-02 NOTE — PROGRESS NOTE ADULT - SUBJECTIVE AND OBJECTIVE BOX
NP Note discussed with  Primary Attending    Patient is a 84y old  Male who presents with a chief complaint of Covid + (01 Jun 2020 09:06)      INTERVAL HPI/OVERNIGHT EVENTS: No new complaints. COVID test 6/01 Positive    MEDICATIONS  (STANDING):  enoxaparin Injectable 40 milliGRAM(s) SubCutaneous daily  folic acid 1 milliGRAM(s) Oral daily  furosemide    Tablet 20 milliGRAM(s) Oral daily  levothyroxine 25 MICROGram(s) Oral daily  polyethylene glycol 3350 17 Gram(s) Oral daily  sertraline 25 milliGRAM(s) Oral daily    MEDICATIONS  (PRN):  acetaminophen   Tablet .. 650 milliGRAM(s) Oral every 6 hours PRN Temp greater or equal to 38C (100.4F), Mild Pain (1 - 3)  ALBUTerol    90 MICROgram(s) HFA Inhaler 2 Puff(s) Inhalation every 6 hours PRN Shortness of Breath and/or Wheezing  guaiFENesin   Syrup  (Sugar-Free) 100 milliGRAM(s) Oral every 6 hours PRN Cough      __________________________________________________  REVIEW OF SYSTEMS:    CONSTITUTIONAL: No fever,   EYES: no acute visual disturbances  NECK: No pain or stiffness  RESPIRATORY: No cough; No shortness of breath  CARDIOVASCULAR: No chest pain, no palpitations  GASTROINTESTINAL: No pain. No nausea or vomiting; No diarrhea   NEUROLOGICAL: No headache or numbness, no tremors  MUSCULOSKELETAL: No joint pain, no muscle pain  GENITOURINARY: no dysuria, no frequency, no hesitancy  PSYCHIATRY: no depression , no anxiety  ALL OTHER  ROS negative        Vital Signs Last 24 Hrs  T(C): 36.3 (02 Jun 2020 05:36), Max: 36.6 (01 Jun 2020 14:35)  T(F): 97.4 (02 Jun 2020 05:36), Max: 97.8 (01 Jun 2020 14:35)  HR: 77 (02 Jun 2020 11:58) (48 - 77)  BP: 142/63 (02 Jun 2020 05:36) (120/65 - 146/75)  BP(mean): --  RR: 18 (02 Jun 2020 05:36) (16 - 18)  SpO2: 99% (02 Jun 2020 11:58) (99% - 100%)    ________________________________________________  PHYSICAL EXAM:  GENERAL: NAD  HEENT: Normocephalic;  conjunctivae and sclerae clear; moist mucous membranes;   NECK : supple  CHEST/LUNG: Clear to auscultation bilaterally with good air entry   HEART: S1 S2  regular; no murmurs, gallops or rubs  ABDOMEN: Soft, Nontender, Nondistended; Bowel sounds present  EXTREMITIES: no cyanosis; no edema; no calf tenderness  SKIN: warm and dry; no rash  NERVOUS SYSTEM:  Awake and alert; Oriented X 2. Ambulating well with PT and a walker.    _________________________________________________  LABS:                        13.5   6.60  )-----------( 170      ( 02 Jun 2020 07:13 )             41.3     06-02    140  |  105  |  29<H>  ----------------------------<  76  4.1   |  28  |  1.01    Ca    9.2      02 Jun 2020 07:13    TPro  6.7  /  Alb  2.9<L>  /  TBili  0.4  /  DBili  x   /  AST  19  /  ALT  16  /  AlkPhos  104  06-02        CAPILLARY BLOOD GLUCOSE            RADIOLOGY & ADDITIONAL TESTS:    Imaging Personally Reviewed:  YES  < from: Xray Chest 1 View- PORTABLE-Urgent (05.15.20 @ 15:26) >  Findings: Normal heart size. Clear lungs. No pneumothorax or pleural effusion. Air over the upper abdomen is likely in the patient's high positioned transverse colon.      Impression:  1.  No active pulmonary disease.    < end of copied text >      Consultant(s) Notes Reviewed:   YES    Care Discussed with Consultants :     Plan of care was discussed with patient and /or primary care giver; all questions and concerns were addressed and care was aligned with patient's wishes.

## 2020-06-03 RX ADMIN — SERTRALINE 25 MILLIGRAM(S): 25 TABLET, FILM COATED ORAL at 12:10

## 2020-06-03 RX ADMIN — ENOXAPARIN SODIUM 40 MILLIGRAM(S): 100 INJECTION SUBCUTANEOUS at 12:10

## 2020-06-03 RX ADMIN — Medication 1 MILLIGRAM(S): at 12:10

## 2020-06-03 RX ADMIN — POLYETHYLENE GLYCOL 3350 17 GRAM(S): 17 POWDER, FOR SOLUTION ORAL at 12:10

## 2020-06-03 NOTE — PROGRESS NOTE ADULT - SUBJECTIVE AND OBJECTIVE BOX
NP Note discussed with  Primary Attending    Patient is a 84y old  Male who presents with a chief complaint of Covid + (02 Jun 2020 12:13)      INTERVAL HPI/OVERNIGHT EVENTS: no new complaints. Ambulating in shell with PT and walker.    MEDICATIONS  (STANDING):  enoxaparin Injectable 40 milliGRAM(s) SubCutaneous daily  folic acid 1 milliGRAM(s) Oral daily  furosemide    Tablet 20 milliGRAM(s) Oral daily  levothyroxine 25 MICROGram(s) Oral daily  polyethylene glycol 3350 17 Gram(s) Oral daily  sertraline 25 milliGRAM(s) Oral daily    MEDICATIONS  (PRN):  acetaminophen   Tablet .. 650 milliGRAM(s) Oral every 6 hours PRN Temp greater or equal to 38C (100.4F), Mild Pain (1 - 3)  ALBUTerol    90 MICROgram(s) HFA Inhaler 2 Puff(s) Inhalation every 6 hours PRN Shortness of Breath and/or Wheezing  guaiFENesin   Syrup  (Sugar-Free) 100 milliGRAM(s) Oral every 6 hours PRN Cough      __________________________________________________  REVIEW OF SYSTEMS:    CONSTITUTIONAL: No fever,   EYES: no acute visual disturbances  NECK: No pain or stiffness  RESPIRATORY: No cough; No shortness of breath  CARDIOVASCULAR: No chest pain, no palpitations  GASTROINTESTINAL: No pain. No nausea or vomiting; No diarrhea   NEUROLOGICAL: No headache or numbness, no tremors  MUSCULOSKELETAL: No joint pain, no muscle pain  GENITOURINARY: no dysuria, no frequency, no hesitancy  PSYCHIATRY: no depression , no anxiety  ALL OTHER  ROS negative        Vital Signs Last 24 Hrs  T(C): 36.3 (03 Jun 2020 05:49), Max: 36.3 (02 Jun 2020 13:01)  T(F): 97.4 (03 Jun 2020 05:49), Max: 97.4 (02 Jun 2020 13:01)  HR: 50 (03 Jun 2020 05:49) (50 - 77)  BP: 128/62 (03 Jun 2020 05:49) (114/66 - 128/62)  BP(mean): --  RR: 16 (03 Jun 2020 05:49) (16 - 16)  SpO2: 99% (03 Jun 2020 05:49) (99% - 100%)    ________________________________________________  PHYSICAL EXAM:  GENERAL: NAD  HEENT: Normocephalic;  conjunctivae and sclerae clear; moist mucous membranes;   NECK : supple  CHEST/LUNG: Clear to auscultation bilaterally with good air entry   HEART: S1 S2  regular; no murmurs, gallops or rubs  ABDOMEN: Soft, Nontender, Nondistended; Bowel sounds present  EXTREMITIES: no cyanosis; no edema; no calf tenderness  SKIN: warm and dry; no rash  NERVOUS SYSTEM:  Awake and alert; Oriented X 2. Ambulating with waler and PT.    _________________________________________________  LABS:                        13.5   6.60  )-----------( 170      ( 02 Jun 2020 07:13 )             41.3     06-02    140  |  105  |  29<H>  ----------------------------<  76  4.1   |  28  |  1.01    Ca    9.2      02 Jun 2020 07:13    TPro  6.7  /  Alb  2.9<L>  /  TBili  0.4  /  DBili  x   /  AST  19  /  ALT  16  /  AlkPhos  104  06-02        CAPILLARY BLOOD GLUCOSE            RADIOLOGY & ADDITIONAL TESTS:    Imaging Personally Reviewed:  YES  < from: Xray Chest 1 View- PORTABLE-Urgent (05.15.20 @ 15:26) >  Findings: Normal heart size. Clear lungs. No pneumothorax or pleural effusion. Air over the upper abdomen is likely in the patient's high positioned transverse colon.      Impression:  1.  No active pulmonary disease.    < end of copied text >      Consultant(s) Notes Reviewed:   YES    Care Discussed with Consultants :     Plan of care was discussed with patient and /or primary care giver; all questions and concerns were addressed and care was aligned with patient's wishes.

## 2020-06-03 NOTE — CHART NOTE - NSCHARTNOTEFT_GEN_A_CORE
Assessment:   84yMalePatient is a 84y old  Male who presents with a chief complaint of Covid + (03 Jun 2020 10:32)  Pt W COVID+. On airborne isolation.  Observed Pt eating Lunch. Pt also Receiving  Sandwiches w meal. Pt feed self. Pt  prefers Finger Food. Labs noted Pt is On [facility]. soft Ensure Enlive BID.     Factors impacting intake: [ x] none [ ] nausea  [ ] vomiting [ ] diarrhea [ ] constipation  [ ]chewing problems [ ] swallowing issues  [ ] other:     Diet Prescription: Diet, Mechanical Soft:   Supplement Feeding Modality:  Oral  Ensure Enlive Cans or Servings Per Day:  1       Frequency:  Two Times a day (05-28-20 @ 13:17)    Intake: ~50%     Current Weight:   % Weight Change    Pertinent Medications: MEDICATIONS  (STANDING):  enoxaparin Injectable 40 milliGRAM(s) SubCutaneous daily  folic acid 1 milliGRAM(s) Oral daily  furosemide    Tablet 20 milliGRAM(s) Oral daily  levothyroxine 25 MICROGram(s) Oral daily  polyethylene glycol 3350 17 Gram(s) Oral daily  sertraline 25 milliGRAM(s) Oral daily    MEDICATIONS  (PRN):  acetaminophen   Tablet .. 650 milliGRAM(s) Oral every 6 hours PRN Temp greater or equal to 38C (100.4F), Mild Pain (1 - 3)  ALBUTerol    90 MICROgram(s) HFA Inhaler 2 Puff(s) Inhalation every 6 hours PRN Shortness of Breath and/or Wheezing  guaiFENesin   Syrup  (Sugar-Free) 100 milliGRAM(s) Oral every 6 hours PRN Cough    Pertinent Labs: 06-02 Na140 mmol/L Glu 76 mg/dL K+ 4.1 mmol/L Cr  1.01 mg/dL BUN 29 mg/dL<H> 06-02 Alb 2.9 g/dL<L> 05-16 Chol 232 mg/dL<H>  mg/dL HDL 46 mg/dL Trig 176 mg/dL<H>     CAPILLARY BLOOD GLUCOSE        Skin:     Estimated Needs:   [ ] no change since previous assessment  [ ] recalculated:     Previous Nutrition Diagnosis:   [ ] Inadequate Energy Intake [x ]Inadequate Oral Intake [ ] Excessive Energy Intake   [ ] Underweight [ ] Increased Nutrient Needs [ ] Overweight/Obesity   [ ] Altered GI Function [ ] Unintended Weight Loss [ ] Food & Nutrition Related Knowledge Deficit [ ] Malnutrition     Nutrition Diagnosis is [x ] ongoing  [ ] resolved [ ] not applicable     New Nutrition Diagnosis: [ ] not applicable       Interventions:   Recommend  [ ] Change Diet To:  [x ] Nutrition Supplement Continue with Ensure Enlive BID.   [ ] Nutrition Support  [x ] Other:  Continue to provide Nutrient Dense snacks    Monitoring and Evaluation:   [ ] PO intake [ x ] Tolerance to diet prescription [ x ] weights [ x ] labs[ x ] follow up per protocol  [ ] other:

## 2020-06-04 LAB — SARS-COV-2 RNA SPEC QL NAA+PROBE: SIGNIFICANT CHANGE UP

## 2020-06-04 RX ADMIN — SERTRALINE 25 MILLIGRAM(S): 25 TABLET, FILM COATED ORAL at 12:54

## 2020-06-04 RX ADMIN — Medication 1 MILLIGRAM(S): at 12:54

## 2020-06-04 RX ADMIN — POLYETHYLENE GLYCOL 3350 17 GRAM(S): 17 POWDER, FOR SOLUTION ORAL at 12:54

## 2020-06-04 RX ADMIN — ENOXAPARIN SODIUM 40 MILLIGRAM(S): 100 INJECTION SUBCUTANEOUS at 12:53

## 2020-06-04 RX ADMIN — Medication 25 MICROGRAM(S): at 05:24

## 2020-06-04 NOTE — PROGRESS NOTE ADULT - SUBJECTIVE AND OBJECTIVE BOX
NP Note discussed with  Primary Attending    Patient is a 84y old  Male who presents with a chief complaint of Covid + (03 Jun 2020 10:32)      INTERVAL HPI/OVERNIGHT EVENTS: no new complaints    MEDICATIONS  (STANDING):  enoxaparin Injectable 40 milliGRAM(s) SubCutaneous daily  folic acid 1 milliGRAM(s) Oral daily  furosemide    Tablet 20 milliGRAM(s) Oral daily  levothyroxine 25 MICROGram(s) Oral daily  polyethylene glycol 3350 17 Gram(s) Oral daily  sertraline 25 milliGRAM(s) Oral daily    MEDICATIONS  (PRN):  acetaminophen   Tablet .. 650 milliGRAM(s) Oral every 6 hours PRN Temp greater or equal to 38C (100.4F), Mild Pain (1 - 3)  ALBUTerol    90 MICROgram(s) HFA Inhaler 2 Puff(s) Inhalation every 6 hours PRN Shortness of Breath and/or Wheezing  guaiFENesin   Syrup  (Sugar-Free) 100 milliGRAM(s) Oral every 6 hours PRN Cough      __________________________________________________  REVIEW OF SYSTEMS:    CONSTITUTIONAL: No fever,   EYES: no acute visual disturbances  NECK: No pain or stiffness  RESPIRATORY: No cough; No shortness of breath  CARDIOVASCULAR: No chest pain, no palpitations  GASTROINTESTINAL: No pain. No nausea or vomiting; No diarrhea   NEUROLOGICAL: No headache or numbness, no tremors  MUSCULOSKELETAL: No joint pain, no muscle pain  GENITOURINARY: no dysuria, no frequency, no hesitancy  PSYCHIATRY: no depression , no anxiety  ALL OTHER  ROS negative        Vital Signs Last 24 Hrs  T(C): 36.8 (04 Jun 2020 13:00), Max: 36.8 (04 Jun 2020 05:35)  T(F): 98.2 (04 Jun 2020 13:00), Max: 98.3 (04 Jun 2020 05:35)  HR: 67 (04 Jun 2020 13:00) (64 - 69)  BP: 125/60 (04 Jun 2020 13:00) (94/40 - 125/60)  BP(mean): --  RR: 16 (04 Jun 2020 13:00) (16 - 16)  SpO2: 100% (04 Jun 2020 13:00) (98% - 100%)    ________________________________________________  PHYSICAL EXAM:  GENERAL: NAD  HEENT: Normocephalic;  conjunctivae and sclerae clear; moist mucous membranes;   NECK : supple  CHEST/LUNG: Clear to auscultation bilaterally with good air entry   HEART: S1 S2  regular; no murmurs, gallops or rubs  ABDOMEN: Soft, Nontender, Nondistended; Bowel sounds present  EXTREMITIES: no cyanosis; no edema; no calf tenderness  SKIN: warm and dry; no rash  NERVOUS SYSTEM:  Awake and alert; Oriented  to place, person and time ; no new deficits    _________________________________________________  LABS:              CAPILLARY BLOOD GLUCOSE            RADIOLOGY & ADDITIONAL TESTS:    Imaging Personally Reviewed:  YES  < from: Xray Chest 1 View- PORTABLE-Urgent (05.15.20 @ 15:26) >  Findings: Normal heart size. Clear lungs. No pneumothorax or pleural effusion. Air over the upper abdomen is likely in the patient's high positioned transverse colon.      Impression:  1.  No active pulmonary disease.    < end of copied text >      Consultant(s) Notes Reviewed:   YES    Care Discussed with Consultants :     Plan of care was discussed with patient and /or primary care giver; all questions and concerns were addressed and care was aligned with patient's wishes.

## 2020-06-05 DIAGNOSIS — E44.1 MILD PROTEIN-CALORIE MALNUTRITION: ICD-10-CM

## 2020-06-05 DIAGNOSIS — F32.9 MAJOR DEPRESSIVE DISORDER, SINGLE EPISODE, UNSPECIFIED: ICD-10-CM

## 2020-06-05 LAB
ALBUMIN SERPL ELPH-MCNC: 3 G/DL — LOW (ref 3.5–5)
ALP SERPL-CCNC: 103 U/L — SIGNIFICANT CHANGE UP (ref 40–120)
ALT FLD-CCNC: 15 U/L DA — SIGNIFICANT CHANGE UP (ref 10–60)
ANION GAP SERPL CALC-SCNC: 7 MMOL/L — SIGNIFICANT CHANGE UP (ref 5–17)
AST SERPL-CCNC: 15 U/L — SIGNIFICANT CHANGE UP (ref 10–40)
BILIRUB SERPL-MCNC: 0.4 MG/DL — SIGNIFICANT CHANGE UP (ref 0.2–1.2)
BUN SERPL-MCNC: 33 MG/DL — HIGH (ref 7–18)
CALCIUM SERPL-MCNC: 9.1 MG/DL — SIGNIFICANT CHANGE UP (ref 8.4–10.5)
CHLORIDE SERPL-SCNC: 105 MMOL/L — SIGNIFICANT CHANGE UP (ref 96–108)
CO2 SERPL-SCNC: 29 MMOL/L — SIGNIFICANT CHANGE UP (ref 22–31)
CREAT SERPL-MCNC: 1.05 MG/DL — SIGNIFICANT CHANGE UP (ref 0.5–1.3)
GLUCOSE SERPL-MCNC: 80 MG/DL — SIGNIFICANT CHANGE UP (ref 70–99)
HCT VFR BLD CALC: 38.6 % — LOW (ref 39–50)
HGB BLD-MCNC: 12.6 G/DL — LOW (ref 13–17)
MCHC RBC-ENTMCNC: 29.7 PG — SIGNIFICANT CHANGE UP (ref 27–34)
MCHC RBC-ENTMCNC: 32.6 GM/DL — SIGNIFICANT CHANGE UP (ref 32–36)
MCV RBC AUTO: 91 FL — SIGNIFICANT CHANGE UP (ref 80–100)
NRBC # BLD: 0 /100 WBCS — SIGNIFICANT CHANGE UP (ref 0–0)
PLATELET # BLD AUTO: 180 K/UL — SIGNIFICANT CHANGE UP (ref 150–400)
POTASSIUM SERPL-MCNC: 4.3 MMOL/L — SIGNIFICANT CHANGE UP (ref 3.5–5.3)
POTASSIUM SERPL-SCNC: 4.3 MMOL/L — SIGNIFICANT CHANGE UP (ref 3.5–5.3)
PROT SERPL-MCNC: 6.5 G/DL — SIGNIFICANT CHANGE UP (ref 6–8.3)
RBC # BLD: 4.24 M/UL — SIGNIFICANT CHANGE UP (ref 4.2–5.8)
RBC # FLD: 14 % — SIGNIFICANT CHANGE UP (ref 10.3–14.5)
SARS-COV-2 RNA SPEC QL NAA+PROBE: SIGNIFICANT CHANGE UP
SODIUM SERPL-SCNC: 141 MMOL/L — SIGNIFICANT CHANGE UP (ref 135–145)
WBC # BLD: 6.02 K/UL — SIGNIFICANT CHANGE UP (ref 3.8–10.5)
WBC # FLD AUTO: 6.02 K/UL — SIGNIFICANT CHANGE UP (ref 3.8–10.5)

## 2020-06-05 RX ADMIN — ENOXAPARIN SODIUM 40 MILLIGRAM(S): 100 INJECTION SUBCUTANEOUS at 12:15

## 2020-06-05 RX ADMIN — POLYETHYLENE GLYCOL 3350 17 GRAM(S): 17 POWDER, FOR SOLUTION ORAL at 12:15

## 2020-06-05 RX ADMIN — Medication 1 MILLIGRAM(S): at 12:15

## 2020-06-05 RX ADMIN — SERTRALINE 25 MILLIGRAM(S): 25 TABLET, FILM COATED ORAL at 13:41

## 2020-06-05 NOTE — PROGRESS NOTE ADULT - SUBJECTIVE AND OBJECTIVE BOX
NP Note discussed with  Primary Attending    Patient is a 85y old  Male who presents with a chief complaint of Covid + (04 Jun 2020 15:26)      INTERVAL HPI/OVERNIGHT EVENTS: no new complaints    MEDICATIONS  (STANDING):  enoxaparin Injectable 40 milliGRAM(s) SubCutaneous daily  folic acid 1 milliGRAM(s) Oral daily  furosemide    Tablet 20 milliGRAM(s) Oral daily  levothyroxine 25 MICROGram(s) Oral daily  polyethylene glycol 3350 17 Gram(s) Oral daily  sertraline 25 milliGRAM(s) Oral daily    MEDICATIONS  (PRN):  acetaminophen   Tablet .. 650 milliGRAM(s) Oral every 6 hours PRN Temp greater or equal to 38C (100.4F), Mild Pain (1 - 3)  ALBUTerol    90 MICROgram(s) HFA Inhaler 2 Puff(s) Inhalation every 6 hours PRN Shortness of Breath and/or Wheezing  guaiFENesin   Syrup  (Sugar-Free) 100 milliGRAM(s) Oral every 6 hours PRN Cough      __________________________________________________  REVIEW OF SYSTEMS:    CONSTITUTIONAL: No fever,   EYES: no acute visual disturbances  NECK: No pain or stiffness  RESPIRATORY: No cough; No shortness of breath  CARDIOVASCULAR: No chest pain, no palpitations  GASTROINTESTINAL: No pain. No nausea or vomiting; No diarrhea   NEUROLOGICAL: No headache or numbness, no tremors  MUSCULOSKELETAL: No joint pain, no muscle pain  GENITOURINARY: no dysuria, no frequency, no hesitancy  PSYCHIATRY: no depression , no anxiety  ALL OTHER  ROS negative        Vital Signs Last 24 Hrs  T(C): 36.8 (05 Jun 2020 05:33), Max: 36.8 (04 Jun 2020 13:00)  T(F): 98.2 (05 Jun 2020 05:33), Max: 98.2 (04 Jun 2020 13:00)  HR: 63 (05 Jun 2020 05:33) (57 - 67)  BP: 120/53 (05 Jun 2020 05:33) (120/53 - 137/62)  BP(mean): --  RR: 16 (05 Jun 2020 05:33) (16 - 16)  SpO2: 98% (05 Jun 2020 05:33) (98% - 100%)    ________________________________________________  PHYSICAL EXAM:  GENERAL: NAD  HEENT: Normocephalic;  conjunctivae and sclerae clear; moist mucous membranes;   NECK : supple  CHEST/LUNG: Clear to auscultation bilaterally with good air entry   HEART: S1 S2  regular; no murmurs, gallops or rubs  ABDOMEN: Soft, Nontender, Nondistended; Bowel sounds present  EXTREMITIES: no cyanosis; no edema; no calf tenderness  SKIN: warm and dry; no rash  NERVOUS SYSTEM:  Awake and alert; Oriented  to place, person and time ; no new deficits    _________________________________________________  LABS:                        12.6   6.02  )-----------( 180      ( 05 Jun 2020 07:13 )             38.6     06-05    141  |  105  |  33<H>  ----------------------------<  80  4.3   |  29  |  1.05    Ca    9.1      05 Jun 2020 07:13    TPro  6.5  /  Alb  3.0<L>  /  TBili  0.4  /  DBili  x   /  AST  15  /  ALT  15  /  AlkPhos  103  06-05        CAPILLARY BLOOD GLUCOSE            RADIOLOGY & ADDITIONAL TESTS:    Imaging Personally Reviewed:  YES/NO    Consultant(s) Notes Reviewed:   YES/ No    Care Discussed with Consultants :     Plan of care was discussed with patient and /or primary care giver; all questions and concerns were addressed and care was aligned with patient's wishes. NP Note discussed with  Primary Attending    Patient is a 85y old  Male who presents with a chief complaint of Covid + (04 Jun 2020 15:26)    HPI    84 year old male with PMHx of dementia, depression, frequent falls, and hypothyroidism and no pertinent PSHx presents to the ED from [facility] after testing positive for COVID-19 , who presented to ED with low appetite, feeling unwell, was initially sent back to [facility]. However, patient is unable to maintain isolation and was sent back.     Patient had CTA chest  showing no PE, The lungs are clear aside from a small nonspecific central ground glass opacity in the right lower lobe.    INTERVAL HPI/OVERNIGHT EVENTS: no new complaints    MEDICATIONS  (STANDING):  enoxaparin Injectable 40 milliGRAM(s) SubCutaneous daily  folic acid 1 milliGRAM(s) Oral daily  furosemide    Tablet 20 milliGRAM(s) Oral daily  levothyroxine 25 MICROGram(s) Oral daily  polyethylene glycol 3350 17 Gram(s) Oral daily  sertraline 25 milliGRAM(s) Oral daily    MEDICATIONS  (PRN):  acetaminophen   Tablet .. 650 milliGRAM(s) Oral every 6 hours PRN Temp greater or equal to 38C (100.4F), Mild Pain (1 - 3)  ALBUTerol    90 MICROgram(s) HFA Inhaler 2 Puff(s) Inhalation every 6 hours PRN Shortness of Breath and/or Wheezing  guaiFENesin   Syrup  (Sugar-Free) 100 milliGRAM(s) Oral every 6 hours PRN Cough      __________________________________________________  REVIEW OF SYSTEMS:    CONSTITUTIONAL: No fever,   EYES: no acute visual disturbances  NECK: No pain or stiffness  RESPIRATORY: No cough; No shortness of breath  CARDIOVASCULAR: No chest pain, no palpitations  GASTROINTESTINAL: No pain. No nausea or vomiting; No diarrhea   NEUROLOGICAL: No headache or numbness, no tremors  MUSCULOSKELETAL: No joint pain, no muscle pain  GENITOURINARY: no dysuria, no frequency, no hesitancy  PSYCHIATRY: no depression , no anxiety  ALL OTHER  ROS negative        Vital Signs Last 24 Hrs  T(C): 36.8 (05 Jun 2020 05:33), Max: 36.8 (04 Jun 2020 13:00)  T(F): 98.2 (05 Jun 2020 05:33), Max: 98.2 (04 Jun 2020 13:00)  HR: 63 (05 Jun 2020 05:33) (57 - 67)  BP: 120/53 (05 Jun 2020 05:33) (120/53 - 137/62)  BP(mean): --  RR: 16 (05 Jun 2020 05:33) (16 - 16)  SpO2: 98% (05 Jun 2020 05:33) (98% - 100%)    ________________________________________________  PHYSICAL EXAM:  GENERAL: NAD  HEENT: Normocephalic;  conjunctivae and sclerae clear; moist mucous membranes;   NECK : supple  CHEST/LUNG: Clear to auscultation bilaterally with good air entry   HEART: S1 S2  regular; no murmurs, gallops or rubs  ABDOMEN: Soft, Nontender, Nondistended; Bowel sounds present  EXTREMITIES: no cyanosis; no edema; no calf tenderness  SKIN: warm and dry; no rash  NERVOUS SYSTEM:  Awake and alert; Oriented  to place, person and time ; no new deficits    _________________________________________________  LABS:                        12.6   6.02  )-----------( 180      ( 05 Jun 2020 07:13 )             38.6     06-05    141  |  105  |  33<H>  ----------------------------<  80  4.3   |  29  |  1.05    Ca    9.1      05 Jun 2020 07:13    TPro  6.5  /  Alb  3.0<L>  /  TBili  0.4  /  DBili  x   /  AST  15  /  ALT  15  /  AlkPhos  103  06-05        CAPILLARY BLOOD GLUCOSE            RADIOLOGY & ADDITIONAL TESTS:    Imaging Personally Reviewed:  YES/NO    Consultant(s) Notes Reviewed:   YES/ No    Care Discussed with Consultants :     Plan of care was discussed with patient and /or primary care giver; all questions and concerns were addressed and care was aligned with patient's wishes. NP Note discussed with  Primary Attending    Patient is a 85y old  Male who presents with a chief complaint of Covid + (04 Jun 2020 15:26)    HPI    84 year old male with PMHx of dementia, depression, frequent falls, and hypothyroidism and no pertinent PSHx presents to the ED from [facility] after testing positive for COVID-19 , who presented to ED with low appetite, feeling unwell, was initially sent back to [facility]. However, patient is unable to maintain isolation and was sent back.     Patient had CTA chest  showing no PE, The lungs are clear aside from a small nonspecific central ground glass opacity in the right lower lobe. COVID19 PCR detected. Pt oxygenating well on RA.     INTERVAL HPI/OVERNIGHT EVENTS: Pt seen and examined this morning. Pt reports feeling well and happy for his birthday.     MEDICATIONS  (STANDING):  enoxaparin Injectable 40 milliGRAM(s) SubCutaneous daily  folic acid 1 milliGRAM(s) Oral daily  furosemide    Tablet 20 milliGRAM(s) Oral daily  levothyroxine 25 MICROGram(s) Oral daily  polyethylene glycol 3350 17 Gram(s) Oral daily  sertraline 25 milliGRAM(s) Oral daily    MEDICATIONS  (PRN):  acetaminophen   Tablet .. 650 milliGRAM(s) Oral every 6 hours PRN Temp greater or equal to 38C (100.4F), Mild Pain (1 - 3)  ALBUTerol    90 MICROgram(s) HFA Inhaler 2 Puff(s) Inhalation every 6 hours PRN Shortness of Breath and/or Wheezing  guaiFENesin   Syrup  (Sugar-Free) 100 milliGRAM(s) Oral every 6 hours PRN Cough      __________________________________________________  REVIEW OF SYSTEMS:    CONSTITUTIONAL: No fever,   EYES: no acute visual disturbances  NECK: No pain or stiffness  RESPIRATORY: No cough; No shortness of breath  CARDIOVASCULAR: No chest pain, no palpitations  GASTROINTESTINAL: No pain. No nausea or vomiting; No diarrhea   NEUROLOGICAL: No headache or numbness, no tremors  MUSCULOSKELETAL: No joint pain, no muscle pain  GENITOURINARY: no dysuria, no frequency, no hesitancy  PSYCHIATRY: no depression , no anxiety  ALL OTHER  ROS negative        Vital Signs Last 24 Hrs  T(C): 36.8 (05 Jun 2020 05:33), Max: 36.8 (04 Jun 2020 13:00)  T(F): 98.2 (05 Jun 2020 05:33), Max: 98.2 (04 Jun 2020 13:00)  HR: 63 (05 Jun 2020 05:33) (57 - 67)  BP: 120/53 (05 Jun 2020 05:33) (120/53 - 137/62)  BP(mean): --  RR: 16 (05 Jun 2020 05:33) (16 - 16)  SpO2: 98% (05 Jun 2020 05:33) (98% - 100%)    ________________________________________________  PHYSICAL EXAM:  GENERAL: NAD  HEENT: Normocephalic;  conjunctivae and sclerae clear; moist mucous membranes;   NECK : supple  CHEST/LUNG: Clear to auscultation bilaterally with good air entry   HEART: S1 S2  regular; no murmurs, gallops or rubs  ABDOMEN: Soft, Nontender, Nondistended; Bowel sounds present  EXTREMITIES: no cyanosis; no edema; no calf tenderness  SKIN: warm and dry; no rash  NERVOUS SYSTEM:  Awake and alert; Oriented  to place, person and time ; no new deficits    _________________________________________________  LABS:                        12.6   6.02  )-----------( 180      ( 05 Jun 2020 07:13 )             38.6     06-05    141  |  105  |  33<H>  ----------------------------<  80  4.3   |  29  |  1.05    Ca    9.1      05 Jun 2020 07:13    TPro  6.5  /  Alb  3.0<L>  /  TBili  0.4  /  DBili  x   /  AST  15  /  ALT  15  /  AlkPhos  103  06-05        CAPILLARY BLOOD GLUCOSE            RADIOLOGY & ADDITIONAL TESTS:    Imaging Personally Reviewed:  YES/NO    Consultant(s) Notes Reviewed:   YES/ No    Care Discussed with Consultants :     Plan of care was discussed with patient and /or primary care giver; all questions and concerns were addressed and care was aligned with patient's wishes.

## 2020-06-05 NOTE — PROGRESS NOTE ADULT - NSHPATTENDINGPLANDISCUSS_GEN_ALL_CORE
np covering

## 2020-06-06 LAB
ANION GAP SERPL CALC-SCNC: 8 MMOL/L — SIGNIFICANT CHANGE UP (ref 5–17)
BUN SERPL-MCNC: 28 MG/DL — HIGH (ref 7–18)
CALCIUM SERPL-MCNC: 9 MG/DL — SIGNIFICANT CHANGE UP (ref 8.4–10.5)
CHLORIDE SERPL-SCNC: 107 MMOL/L — SIGNIFICANT CHANGE UP (ref 96–108)
CO2 SERPL-SCNC: 27 MMOL/L — SIGNIFICANT CHANGE UP (ref 22–31)
CREAT SERPL-MCNC: 1.05 MG/DL — SIGNIFICANT CHANGE UP (ref 0.5–1.3)
GLUCOSE SERPL-MCNC: 94 MG/DL — SIGNIFICANT CHANGE UP (ref 70–99)
HCT VFR BLD CALC: 39.1 % — SIGNIFICANT CHANGE UP (ref 39–50)
HGB BLD-MCNC: 12.5 G/DL — LOW (ref 13–17)
MCHC RBC-ENTMCNC: 29.3 PG — SIGNIFICANT CHANGE UP (ref 27–34)
MCHC RBC-ENTMCNC: 32 GM/DL — SIGNIFICANT CHANGE UP (ref 32–36)
MCV RBC AUTO: 91.8 FL — SIGNIFICANT CHANGE UP (ref 80–100)
NRBC # BLD: 0 /100 WBCS — SIGNIFICANT CHANGE UP (ref 0–0)
PLATELET # BLD AUTO: 197 K/UL — SIGNIFICANT CHANGE UP (ref 150–400)
POTASSIUM SERPL-MCNC: 3.8 MMOL/L — SIGNIFICANT CHANGE UP (ref 3.5–5.3)
POTASSIUM SERPL-SCNC: 3.8 MMOL/L — SIGNIFICANT CHANGE UP (ref 3.5–5.3)
RBC # BLD: 4.26 M/UL — SIGNIFICANT CHANGE UP (ref 4.2–5.8)
RBC # FLD: 14 % — SIGNIFICANT CHANGE UP (ref 10.3–14.5)
SODIUM SERPL-SCNC: 142 MMOL/L — SIGNIFICANT CHANGE UP (ref 135–145)
WBC # BLD: 6.66 K/UL — SIGNIFICANT CHANGE UP (ref 3.8–10.5)
WBC # FLD AUTO: 6.66 K/UL — SIGNIFICANT CHANGE UP (ref 3.8–10.5)

## 2020-06-06 RX ORDER — RIVAROXABAN 15 MG-20MG
1 KIT ORAL
Qty: 30 | Refills: 0
Start: 2020-06-06 | End: 2020-07-05

## 2020-06-06 RX ADMIN — ENOXAPARIN SODIUM 40 MILLIGRAM(S): 100 INJECTION SUBCUTANEOUS at 12:03

## 2020-06-06 NOTE — PROGRESS NOTE ADULT - SUBJECTIVE AND OBJECTIVE BOX
Patient is a 85y old  Male who presents with a chief complaint of Covid + (04 Jun 2020 15:26)    HPI    84 year old male with PMHx of dementia, depression, frequent falls, and hypothyroidism and no pertinent PSHx presents to the ED from [facility] after testing positive for COVID-19 , who presented to ED with low appetite, feeling unwell, was initially sent back to [facility]. However, patient is unable to maintain isolation and was sent back.     Patient had CTA chest  showing no PE, The lungs are clear aside from a small nonspecific central ground glass opacity in the right lower lobe. COVID19 PCR detected. Pt oxygenating well on RA.     INTERVAL HPI/OVERNIGHT EVENTS: Pt seen and examined this morning. Pt reports feeling well and happy for his birthday.     MEDICATIONS  (STANDING):  enoxaparin Injectable 40 milliGRAM(s) SubCutaneous daily  folic acid 1 milliGRAM(s) Oral daily  furosemide    Tablet 20 milliGRAM(s) Oral daily  levothyroxine 25 MICROGram(s) Oral daily  polyethylene glycol 3350 17 Gram(s) Oral daily  sertraline 25 milliGRAM(s) Oral daily    MEDICATIONS  (PRN):  acetaminophen   Tablet .. 650 milliGRAM(s) Oral every 6 hours PRN Temp greater or equal to 38C (100.4F), Mild Pain (1 - 3)  ALBUTerol    90 MICROgram(s) HFA Inhaler 2 Puff(s) Inhalation every 6 hours PRN Shortness of Breath and/or Wheezing  guaiFENesin   Syrup  (Sugar-Free) 100 milliGRAM(s) Oral every 6 hours PRN Cough    __________________________________________________  REVIEW OF SYSTEMS:    CONSTITUTIONAL: No fever,   EYES: no acute visual disturbances  NECK: No pain or stiffness  RESPIRATORY: No cough; No shortness of breath  CARDIOVASCULAR: No chest pain, no palpitations  GASTROINTESTINAL: No pain. No nausea or vomiting; No diarrhea   NEUROLOGICAL: No headache or numbness, no tremors  MUSCULOSKELETAL: No joint pain, no muscle pain  GENITOURINARY: no dysuria, no frequency, no hesitancy  PSYCHIATRY: no depression , no anxiety  ALL OTHER  ROS negative      Vital Signs Last 24 Hrs  T(C): 36.6 (06 Jun 2020 20:50), Max: 36.6 (06 Jun 2020 20:50)  T(F): 97.9 (06 Jun 2020 20:50), Max: 97.9 (06 Jun 2020 20:50)  HR: 56 (06 Jun 2020 20:50) (56 - 74)  BP: 136/60 (06 Jun 2020 20:50) (123/71 - 136/60)  BP(mean): --  RR: 16 (06 Jun 2020 20:50) (16 - 18)  SpO2: 98% (06 Jun 2020 20:50) (98% - 100%)  ________________________________________________  PHYSICAL EXAM:  GENERAL: NAD  HEENT: Normocephalic;  conjunctivae and sclerae clear; moist mucous membranes;   NECK : supple  CHEST/LUNG: Clear to auscultation bilaterally with good air entry   HEART: S1 S2  regular; no murmurs, gallops or rubs  ABDOMEN: Soft, Nontender, Nondistended; Bowel sounds present  EXTREMITIES: no cyanosis; no edema; no calf tenderness  SKIN: warm and dry; no rash  NERVOUS SYSTEM:  Awake and alert; Oriented  to place, person and time ; no new deficits    _________________________________________________  LABS:  06-06    142  |  107  |  28<H>  ----------------------------<  94  3.8   |  27  |  1.05    Ca    9.0      06 Jun 2020 07:43    TPro  6.5  /  Alb  3.0<L>  /  TBili  0.4  /  DBili      /  AST  15  /  ALT  15  /  AlkPhos  103  06-05    Creatinine Trend: 1.05 <--, 1.05 <--, 1.01 <--                        12.5   6.66  )-----------( 197      ( 06 Jun 2020 07:43 )             39.1     Urine Studies:            LIVER FUNCTIONS - ( 05 Jun 2020 07:13 )  Alb: 3.0 g/dL / Pro: 6.5 g/dL / ALK PHOS: 103 U/L / ALT: 15 U/L DA / AST: 15 U/L / GGT: x                 RADIOLOGY & ADDITIONAL TESTS:    Imaging Personally Reviewed:  YES/NO    Consultant(s) Notes Reviewed:   YES/ No    Care Discussed with Consultants :     Plan of care was discussed with patient and /or primary care giver; all questions and concerns were addressed and care was aligned with patient's wishes.

## 2020-06-07 LAB
ANION GAP SERPL CALC-SCNC: 7 MMOL/L — SIGNIFICANT CHANGE UP (ref 5–17)
BUN SERPL-MCNC: 28 MG/DL — HIGH (ref 7–18)
CALCIUM SERPL-MCNC: 9 MG/DL — SIGNIFICANT CHANGE UP (ref 8.4–10.5)
CHLORIDE SERPL-SCNC: 106 MMOL/L — SIGNIFICANT CHANGE UP (ref 96–108)
CO2 SERPL-SCNC: 28 MMOL/L — SIGNIFICANT CHANGE UP (ref 22–31)
CREAT SERPL-MCNC: 1.03 MG/DL — SIGNIFICANT CHANGE UP (ref 0.5–1.3)
GLUCOSE SERPL-MCNC: 82 MG/DL — SIGNIFICANT CHANGE UP (ref 70–99)
HCT VFR BLD CALC: 38.9 % — LOW (ref 39–50)
HGB BLD-MCNC: 12.6 G/DL — LOW (ref 13–17)
MCHC RBC-ENTMCNC: 29.6 PG — SIGNIFICANT CHANGE UP (ref 27–34)
MCHC RBC-ENTMCNC: 32.4 GM/DL — SIGNIFICANT CHANGE UP (ref 32–36)
MCV RBC AUTO: 91.3 FL — SIGNIFICANT CHANGE UP (ref 80–100)
NRBC # BLD: 0 /100 WBCS — SIGNIFICANT CHANGE UP (ref 0–0)
PLATELET # BLD AUTO: 194 K/UL — SIGNIFICANT CHANGE UP (ref 150–400)
POTASSIUM SERPL-MCNC: 3.9 MMOL/L — SIGNIFICANT CHANGE UP (ref 3.5–5.3)
POTASSIUM SERPL-SCNC: 3.9 MMOL/L — SIGNIFICANT CHANGE UP (ref 3.5–5.3)
RBC # BLD: 4.26 M/UL — SIGNIFICANT CHANGE UP (ref 4.2–5.8)
RBC # FLD: 14 % — SIGNIFICANT CHANGE UP (ref 10.3–14.5)
SODIUM SERPL-SCNC: 141 MMOL/L — SIGNIFICANT CHANGE UP (ref 135–145)
WBC # BLD: 6.52 K/UL — SIGNIFICANT CHANGE UP (ref 3.8–10.5)
WBC # FLD AUTO: 6.52 K/UL — SIGNIFICANT CHANGE UP (ref 3.8–10.5)

## 2020-06-07 RX ADMIN — SERTRALINE 25 MILLIGRAM(S): 25 TABLET, FILM COATED ORAL at 12:39

## 2020-06-07 RX ADMIN — Medication 1 MILLIGRAM(S): at 12:39

## 2020-06-07 RX ADMIN — Medication 20 MILLIGRAM(S): at 05:28

## 2020-06-07 RX ADMIN — ENOXAPARIN SODIUM 40 MILLIGRAM(S): 100 INJECTION SUBCUTANEOUS at 12:39

## 2020-06-07 RX ADMIN — Medication 25 MICROGRAM(S): at 05:28

## 2020-06-07 NOTE — PROGRESS NOTE ADULT - PROBLEM SELECTOR PLAN 7
Discharge back to [facility] pending second negative COVID 19.  CM following
Discharge back to [facility] pending second negative COVID 19.  CM following
Discharge back to [facility] pending second negative COVID 19.  CM following

## 2020-06-07 NOTE — PROGRESS NOTE ADULT - SUBJECTIVE AND OBJECTIVE BOX
Patient is a 85y old  Male who presents with a chief complaint of Covid + (04 Jun 2020 15:26)    HPI    84 year old male with PMHx of dementia, depression, frequent falls, and hypothyroidism and no pertinent PSHx presents to the ED from [facility] after testing positive for COVID-19 , who presented to ED with low appetite, feeling unwell, was initially sent back to [facility]. However, patient is unable to maintain isolation and was sent back.     Patient had CTA chest  showing no PE, The lungs are clear aside from a small nonspecific central ground glass opacity in the right lower lobe. COVID19 PCR detected. Pt oxygenating well on RA.     INTERVAL HPI/OVERNIGHT EVENTS: Pt seen and examined this morning. Pt reports feeling well and happy for his birthday.     MEDICATIONS  (STANDING):  enoxaparin Injectable 40 milliGRAM(s) SubCutaneous daily  folic acid 1 milliGRAM(s) Oral daily  furosemide    Tablet 20 milliGRAM(s) Oral daily  levothyroxine 25 MICROGram(s) Oral daily  polyethylene glycol 3350 17 Gram(s) Oral daily  sertraline 25 milliGRAM(s) Oral daily    MEDICATIONS  (PRN):  acetaminophen   Tablet .. 650 milliGRAM(s) Oral every 6 hours PRN Temp greater or equal to 38C (100.4F), Mild Pain (1 - 3)  ALBUTerol    90 MICROgram(s) HFA Inhaler 2 Puff(s) Inhalation every 6 hours PRN Shortness of Breath and/or Wheezing  guaiFENesin   Syrup  (Sugar-Free) 100 milliGRAM(s) Oral every 6 hours PRN Cough      __________________________________________________  REVIEW OF SYSTEMS:    CONSTITUTIONAL: No fever,   EYES: no acute visual disturbances  NECK: No pain or stiffness  RESPIRATORY: No cough; No shortness of breath  CARDIOVASCULAR: No chest pain, no palpitations  GASTROINTESTINAL: No pain. No nausea or vomiting; No diarrhea   NEUROLOGICAL: No headache or numbness, no tremors  MUSCULOSKELETAL: No joint pain, no muscle pain  GENITOURINARY: no dysuria, no frequency, no hesitancy  PSYCHIATRY: no depression , no anxiety  ALL OTHER  ROS negative      Vital Signs Last 24 Hrs  T(C): 36.8 (07 Jun 2020 12:41), Max: 36.8 (07 Jun 2020 12:41)  T(F): 98.2 (07 Jun 2020 12:41), Max: 98.2 (07 Jun 2020 12:41)  HR: 58 (07 Jun 2020 12:41) (52 - 58)  BP: 123/59 (07 Jun 2020 12:41) (122/48 - 136/60)  BP(mean): --  RR: 18 (07 Jun 2020 12:41) (16 - 18)  SpO2: 100% (07 Jun 2020 12:41) (97% - 100%)    PHYSICAL EXAM:  GENERAL: NAD  HEENT: Normocephalic;  conjunctivae and sclerae clear; moist mucous membranes;   NECK : supple  CHEST/LUNG: Clear to auscultation bilaterally with good air entry   HEART: S1 S2  regular; no murmurs, gallops or rubs  ABDOMEN: Soft, Nontender, Nondistended; Bowel sounds present  EXTREMITIES: no cyanosis; no edema; no calf tenderness  SKIN: warm and dry; no rash  NERVOUS SYSTEM:  Awake and alert; Oriented  to place, person and time ; no new deficits    LABS:  06-07    141  |  106  |  28<H>  ----------------------------<  82  3.9   |  28  |  1.03    Ca    9.0      07 Jun 2020 08:47      Creatinine Trend: 1.03 <--, 1.05 <--, 1.05 <--, 1.01 <--                        12.6   6.52  )-----------( 194      ( 07 Jun 2020 08:47 )             38.9     Urine Studies:                oncerns were addressed and care was aligned with patient's wishes.

## 2020-06-08 VITALS
HEART RATE: 61 BPM | SYSTOLIC BLOOD PRESSURE: 120 MMHG | DIASTOLIC BLOOD PRESSURE: 67 MMHG | TEMPERATURE: 97 F | OXYGEN SATURATION: 99 % | RESPIRATION RATE: 18 BRPM

## 2020-06-08 LAB
ANION GAP SERPL CALC-SCNC: 8 MMOL/L — SIGNIFICANT CHANGE UP (ref 5–17)
BUN SERPL-MCNC: 28 MG/DL — HIGH (ref 7–18)
CALCIUM SERPL-MCNC: 9 MG/DL — SIGNIFICANT CHANGE UP (ref 8.4–10.5)
CHLORIDE SERPL-SCNC: 105 MMOL/L — SIGNIFICANT CHANGE UP (ref 96–108)
CO2 SERPL-SCNC: 26 MMOL/L — SIGNIFICANT CHANGE UP (ref 22–31)
CREAT SERPL-MCNC: 0.99 MG/DL — SIGNIFICANT CHANGE UP (ref 0.5–1.3)
GLUCOSE SERPL-MCNC: 77 MG/DL — SIGNIFICANT CHANGE UP (ref 70–99)
POTASSIUM SERPL-MCNC: 3.7 MMOL/L — SIGNIFICANT CHANGE UP (ref 3.5–5.3)
POTASSIUM SERPL-SCNC: 3.7 MMOL/L — SIGNIFICANT CHANGE UP (ref 3.5–5.3)
SODIUM SERPL-SCNC: 139 MMOL/L — SIGNIFICANT CHANGE UP (ref 135–145)

## 2020-06-08 PROCEDURE — 71045 X-RAY EXAM CHEST 1 VIEW: CPT

## 2020-06-08 PROCEDURE — 85610 PROTHROMBIN TIME: CPT

## 2020-06-08 PROCEDURE — 87641 MR-STAPH DNA AMP PROBE: CPT

## 2020-06-08 PROCEDURE — 36415 COLL VENOUS BLD VENIPUNCTURE: CPT

## 2020-06-08 PROCEDURE — 84443 ASSAY THYROID STIM HORMONE: CPT

## 2020-06-08 PROCEDURE — 85027 COMPLETE CBC AUTOMATED: CPT

## 2020-06-08 PROCEDURE — 80053 COMPREHEN METABOLIC PANEL: CPT

## 2020-06-08 PROCEDURE — 83735 ASSAY OF MAGNESIUM: CPT

## 2020-06-08 PROCEDURE — 87640 STAPH A DNA AMP PROBE: CPT

## 2020-06-08 PROCEDURE — 87635 SARS-COV-2 COVID-19 AMP PRB: CPT

## 2020-06-08 PROCEDURE — 80061 LIPID PANEL: CPT

## 2020-06-08 PROCEDURE — 99285 EMERGENCY DEPT VISIT HI MDM: CPT

## 2020-06-08 PROCEDURE — 82607 VITAMIN B-12: CPT

## 2020-06-08 PROCEDURE — 80048 BASIC METABOLIC PNL TOTAL CA: CPT

## 2020-06-08 PROCEDURE — U0003: CPT

## 2020-06-08 PROCEDURE — 93005 ELECTROCARDIOGRAM TRACING: CPT

## 2020-06-08 PROCEDURE — 84100 ASSAY OF PHOSPHORUS: CPT

## 2020-06-08 RX ADMIN — ENOXAPARIN SODIUM 40 MILLIGRAM(S): 100 INJECTION SUBCUTANEOUS at 12:11

## 2020-06-08 RX ADMIN — SERTRALINE 25 MILLIGRAM(S): 25 TABLET, FILM COATED ORAL at 12:12

## 2020-06-08 RX ADMIN — POLYETHYLENE GLYCOL 3350 17 GRAM(S): 17 POWDER, FOR SOLUTION ORAL at 12:12

## 2020-06-08 RX ADMIN — Medication 1 MILLIGRAM(S): at 12:12

## 2020-06-08 NOTE — PROGRESS NOTE ADULT - ASSESSMENT
84 year old male with PMHx of dementia, depression, frequent falls, and hypothyroidism and no pertinent PSHx presents to the ED from [facility] after testing positive for COVID-19 yesterday. Patient is admitted for Covid 19, not requiring oxygen support.   5/19-Pt. is cheerful and comfortable sitting out on chair today, noted more oriented and conversant, stable for discharge however needs two negative COVID19 tests prior to discharge, had a positive result 5/18.  Pt. is asymptomatic, HD stable .
84 year old male with PMHx of dementia, depression, frequent falls, and hypothyroidism and no pertinent PSHx presents to the ED from [facility] after testing positive for COVID-19 yesterday. Patient is admitted for Covid 19, not requiring oxygen support.
84 year old male with PMHx of dementia, depression, frequent falls, and hypothyroidism and no pertinent PSHx presents to the ED from [facility] after testing positive for COVID-19 yesterday. Patient is admitted for Covid 19, not requiring oxygen support.
84 year old male with PMHx of dementia, depression, frequent falls, and hypothyroidism and no pertinent PSHx presents to the ED from [facility] after testing positive for COVID-19 yesterday. Patient is admitted for Covid 19, not requiring oxygen support.
COVID negative times 2. Medically stable for discharge.
Awaiting negative COVID test to go to [facility]

## 2020-06-08 NOTE — PROGRESS NOTE ADULT - PROBLEM SELECTOR PROBLEM 3
DVT prophylaxis
Hypothyroid
Mild protein-calorie malnutrition
Dementia
Hypothyroid
Hypothyroid

## 2020-06-08 NOTE — DISCHARGE NOTE NURSING/CASE MANAGEMENT/SOCIAL WORK - PATIENT PORTAL LINK FT
You can access the FollowMyHealth Patient Portal offered by [facility] by registering at the following website: [email or web address]. By joining [facility]’s FollowMyHealth portal, you will also be able to view your health information using other applications (apps) compatible with our system.

## 2020-06-08 NOTE — PROGRESS NOTE ADULT - REASON FOR ADMISSION
Covid +

## 2020-06-08 NOTE — PROGRESS NOTE ADULT - PROBLEM SELECTOR PROBLEM 5
Depression
Discharge planning issues
Need for prophylactic measure
Discharge planning issues
Need for prophylactic measure

## 2020-06-08 NOTE — PROGRESS NOTE ADULT - PROBLEM SELECTOR PLAN 1
COVID 19 PCR detected last June 1  not detected June 4th  Oxygenating % on RA  c/w bronchodilator prn  f/u repeat COVID 19 PCR
COVID 19 PCR detected last June 1  not detected June 4th  Oxygenating % on RA  c/w bronchodilator prn  f/u repeat COVID 19 PCR
COVID test 6/01 positive.  Repeat test in 3 days.  Continue to monitor oxygen saturation.
COVID test 6/01 positive.  Repeat test tomorrow.  Continue to monitor oxygen saturation.
COVID test positive. F/U new test.  Continue supportive care.  Monitor oxygen saturation.
Continue with Isolation  Continue with O2 support  Continue with medications regimen  Continue with Tylenol  Continue with supportive care  Follow up lab results
Continue with Isolation  Continue with O2 support if needed   Continue with medications regimen  Continue with Tylenol  Continue with supportive care  COVID PCR positive on 05/26/2020  Follow up lab results
Continue with Isolation  Continue with O2 support if needed   Continue with medications regimen  Continue with Tylenol  Continue with supportive care  COVID PCR positive on 05/26/2020  Follow up lab results
Continue with Isolation  Continue with O2 support if needed   Continue with medications regimen  Continue with Tylenol  Continue with supportive care  COVID PCR positive on 05/26/2020  will repeat PCR on 5/29 (requires 2 negative) for discharge to [facility].
Covid 19 PCR still positive on 5/20  pt. asymptomatic  Awaiting 2 negative results prior to discharge to rehab.
Covid 19 PCR still positive on 5/20  pt. asymptomatic  Contact/airborne isolation precautions   Awaiting 2 negative results prior to discharge to rehab.
Repeated COVID test on 5/29 positive.  Continue to monitor oxygen saturation.
-Chest X-ray: No acute pulmonary disease   -CTA chest from yesterday: No PE, small ground glass opacity in right lung   -Covid 19 PCR positive   -Patient completed Zithromax course as out-patient   -Not a candidate for Remdesivir trial, given patient has no hypoxia   -Follow inflammatory markers   -Supportive care with Robitussin and acetaminophen   -Not requiting oxygen support at this time
-Covid 19 PCR still positive on 5/18, pt. asymptomatic  -Awaiting 2 negative results prior to discharge to rehab
-patient p/w feeling unwell, no cough or shortness of breath   -Chest X-ray: No acute pulmonary disease   -CTA chest from yesterday: No PE, small ground glass opacity in right lung   -Covid 19 PCR positive   -Patient completed Zithromax course as out-patient   -Not a candidate for Remdesivir trial, given patient has no hypoxia   -Follow inflammatory markers   -Supportive care with Robitussin and acetaminophen   -Not requiting oxygen support at this time
-patient p/w feeling unwell, no cough or shortness of breath   -Chest X-ray: No acute pulmonary disease   -CTA chest from yesterday: No PE, small ground glass opacity in right lung   -Covid 19 PCR positive   -Patient completed Zithromax course as out-patient   -Not a candidate for Remdesivir trial, given patient has no hypoxia   -Follow inflammatory markers   -Supportive care with Robitussin and acetaminophen   -Not requiting oxygen support at this time
COVID negative times 2.  Continue supportive care.
Repeat COVID test. Was Positive on 5/26.  Monitor oxygen saturation.  Awaiting negative test for [facility].
COVID 19 PCR detected last June 1  not detected June 4th  Oxygenating % on RA  c/w bronchodilator prn  f/u repeat COVID 19 PCR

## 2020-06-08 NOTE — PROGRESS NOTE ADULT - PROBLEM SELECTOR PLAN 2
Currently not on medication regimen  Continue with supportive care   Continue to reorient PRN
Maintain fall and safety measures  Continue supportive measures.
Maintain fall and safety measures.  Continue supportive care.
Maintain fall and safety measures.  Continue supportive measures.
Maintain fall and safety measures.  Continue supportive measures.
Patient has hx of dementia, not on any medications.
Patient has hx of dementia, not on any medications.  [place] as needed
c/w Sertraline  Supportive measures  Fall precautions  aspiration precautions
c/w Sertraline  Supportive measures  Fall precautions  aspiration precautions
-C/w levothyroxine
-Patient has hx of hypothyroidism   -Follow TSH   -C/w levothyroxine
-Patient has hx of hypothyroidism   -TSH WNL  -Cont current levothyroxine dose
-Patient has hx of hypothyroidism   -TSH WNL  -Cont current levothyroxine dose
Maintain fall and safety measures.
Maintain fall and safety measures.  Continue supportive care.
c/w Sertraline  Supportive measures  Fall precautions  aspiration precautions

## 2020-06-08 NOTE — PROGRESS NOTE ADULT - PROBLEM SELECTOR PLAN 6
DVT ppx: Lovenox
DVT ppx: Lovenox
DVT prophylaxis  OOB and ambulate daily.  Medically stable for discharge.
DVT ppx: Lovenox

## 2020-06-08 NOTE — PROGRESS NOTE ADULT - PROBLEM SELECTOR PROBLEM 2
Dementia
Hypothyroid
Dementia

## 2020-06-08 NOTE — PROGRESS NOTE ADULT - PROBLEM SELECTOR PROBLEM 1
COVID-19 virus detected

## 2020-06-08 NOTE — PROGRESS NOTE ADULT - PROBLEM SELECTOR PROBLEM 4
Hypothyroid
Discharge planning issues
Goals of care, counseling/discussion
Hypothyroid
Hypothyroid
DVT prophylaxis
Mild protein-calorie malnutrition
Discharge planning issues

## 2020-06-08 NOTE — PROGRESS NOTE ADULT - PROBLEM SELECTOR PLAN 4
Awaiting for negative COVID test to go to [facility].  Continue DVT prophylaxis.  Medically stable for discharge.
Awaiting for negative COVID test to go to [facility].  Continue DVT prophylaxis.  Medically stable for discharge.
Awaiting for negative COVID test to go to [facility].  Continue DVT prophylaxis.  Medically stable for discharge.
Cannot be transferred to rehab. Need 2 negative COVID tests.  F/U repeat COVID test  Medically stable for discharge.
Patient to remain FULL CODE
Pt. is from [facility], likely to return to [facility]  Pt. will need COVID19 negative   Resulted positive 5/20, retest 5/23
Pt. is from [facility], likely to return to [facility]  Pt. will need COVID19 negative   Resulted positive 5/26 , retest 5/29
Pt. is from [facility], likely to return to [facility]  Pt. will need COVID19 negative   Resulted positive 5/26 , retest 5/29
\
c/w Levothyroxine
c/w Levothyroxine
full code
full code
Encourage oral intake.  Continue Ensure.
IMPROVE VTE score:  [ ] Previous VTE                                                3  [ ] Thrombophilia                                             2  [ ] Lower limb paralysis                                  2        (unable to hold up >15 seconds)    [ ] Current Cancer (within 6 months)            2   [x] Immobilization > 24 hrs                              1  [ ] ICU/CCU stay > 24 hours                            1  [x] Age > 60                                                         1  Lovenox SC
poss dc if covid  neg x2
Awaiting for negative COVID test to go to [facility].  Continue DVT prophylaxis.  Medically stable for discharge.
c/w Levothyroxine

## 2020-08-26 NOTE — ED PROVIDER NOTE - DR. NAME
Patient discharged to home  Discharge instructions were reviewed with patient and daughter  Patient was set up with [facility] and PT/OT    Patients IV was removed and patient was escorted out via wheelchair accompanied by PCA
Clark Gregorio (Attending)

## 2021-01-07 ENCOUNTER — INPATIENT (INPATIENT)
Facility: HOSPITAL | Age: 86
LOS: 0 days | Discharge: NURSING, REHAB OR LONG-TERM CARE | DRG: 65 | End: 2021-01-08
Attending: INTERNAL MEDICINE | Admitting: INTERNAL MEDICINE
Payer: MEDICARE

## 2021-01-07 VITALS
SYSTOLIC BLOOD PRESSURE: 138 MMHG | DIASTOLIC BLOOD PRESSURE: 82 MMHG | HEART RATE: 54 BPM | RESPIRATION RATE: 20 BRPM | TEMPERATURE: 98 F | WEIGHT: 139.99 LBS | HEIGHT: 66 IN | OXYGEN SATURATION: 95 %

## 2021-01-07 DIAGNOSIS — R53.1 WEAKNESS: ICD-10-CM

## 2021-01-07 DIAGNOSIS — Z71.89 OTHER SPECIFIED COUNSELING: ICD-10-CM

## 2021-01-07 DIAGNOSIS — I63.81 OTHER CEREBRAL INFARCTION DUE TO OCCLUSION OR STENOSIS OF SMALL ARTERY: ICD-10-CM

## 2021-01-07 DIAGNOSIS — E03.9 HYPOTHYROIDISM, UNSPECIFIED: ICD-10-CM

## 2021-01-07 DIAGNOSIS — F03.90 UNSPECIFIED DEMENTIA WITHOUT BEHAVIORAL DISTURBANCE: ICD-10-CM

## 2021-01-07 DIAGNOSIS — R29.6 REPEATED FALLS: ICD-10-CM

## 2021-01-07 DIAGNOSIS — Z29.9 ENCOUNTER FOR PROPHYLACTIC MEASURES, UNSPECIFIED: ICD-10-CM

## 2021-01-07 LAB
A1C WITH ESTIMATED AVERAGE GLUCOSE RESULT: 5.1 % — SIGNIFICANT CHANGE UP (ref 4–5.6)
ALBUMIN SERPL ELPH-MCNC: 3 G/DL — LOW (ref 3.5–5)
ALP SERPL-CCNC: 139 U/L — HIGH (ref 40–120)
ALT FLD-CCNC: 19 U/L DA — SIGNIFICANT CHANGE UP (ref 10–60)
ANION GAP SERPL CALC-SCNC: 7 MMOL/L — SIGNIFICANT CHANGE UP (ref 5–17)
AST SERPL-CCNC: 12 U/L — SIGNIFICANT CHANGE UP (ref 10–40)
BASOPHILS # BLD AUTO: 0.03 K/UL — SIGNIFICANT CHANGE UP (ref 0–0.2)
BASOPHILS NFR BLD AUTO: 0.4 % — SIGNIFICANT CHANGE UP (ref 0–2)
BILIRUB SERPL-MCNC: 0.5 MG/DL — SIGNIFICANT CHANGE UP (ref 0.2–1.2)
BUN SERPL-MCNC: 28 MG/DL — HIGH (ref 7–18)
CALCIUM SERPL-MCNC: 9.3 MG/DL — SIGNIFICANT CHANGE UP (ref 8.4–10.5)
CHLORIDE SERPL-SCNC: 102 MMOL/L — SIGNIFICANT CHANGE UP (ref 96–108)
CHOLEST SERPL-MCNC: 249 MG/DL — HIGH
CK MB BLD-MCNC: 1.7 % — SIGNIFICANT CHANGE UP (ref 0–3.5)
CK MB CFR SERPL CALC: 1.1 NG/ML — SIGNIFICANT CHANGE UP (ref 0–3.6)
CK SERPL-CCNC: 65 U/L — SIGNIFICANT CHANGE UP (ref 35–232)
CO2 SERPL-SCNC: 29 MMOL/L — SIGNIFICANT CHANGE UP (ref 22–31)
CREAT SERPL-MCNC: 1.14 MG/DL — SIGNIFICANT CHANGE UP (ref 0.5–1.3)
EOSINOPHIL # BLD AUTO: 0.1 K/UL — SIGNIFICANT CHANGE UP (ref 0–0.5)
EOSINOPHIL NFR BLD AUTO: 1.4 % — SIGNIFICANT CHANGE UP (ref 0–6)
ESTIMATED AVERAGE GLUCOSE: 100 MG/DL — SIGNIFICANT CHANGE UP (ref 68–114)
FOLATE SERPL-MCNC: >20 NG/ML — SIGNIFICANT CHANGE UP
GLUCOSE SERPL-MCNC: 81 MG/DL — SIGNIFICANT CHANGE UP (ref 70–99)
HCT VFR BLD CALC: 41.4 % — SIGNIFICANT CHANGE UP (ref 39–50)
HDLC SERPL-MCNC: 46 MG/DL — SIGNIFICANT CHANGE UP
HGB BLD-MCNC: 13.6 G/DL — SIGNIFICANT CHANGE UP (ref 13–17)
IMM GRANULOCYTES NFR BLD AUTO: 0.5 % — SIGNIFICANT CHANGE UP (ref 0–1.5)
INR BLD: 1.04 RATIO — SIGNIFICANT CHANGE UP (ref 0.88–1.16)
LIPID PNL WITH DIRECT LDL SERPL: 142 MG/DL — HIGH
LYMPHOCYTES # BLD AUTO: 1.45 K/UL — SIGNIFICANT CHANGE UP (ref 1–3.3)
LYMPHOCYTES # BLD AUTO: 19.6 % — SIGNIFICANT CHANGE UP (ref 13–44)
MCHC RBC-ENTMCNC: 29.4 PG — SIGNIFICANT CHANGE UP (ref 27–34)
MCHC RBC-ENTMCNC: 32.9 GM/DL — SIGNIFICANT CHANGE UP (ref 32–36)
MCV RBC AUTO: 89.4 FL — SIGNIFICANT CHANGE UP (ref 80–100)
MONOCYTES # BLD AUTO: 0.67 K/UL — SIGNIFICANT CHANGE UP (ref 0–0.9)
MONOCYTES NFR BLD AUTO: 9.1 % — SIGNIFICANT CHANGE UP (ref 2–14)
NEUTROPHILS # BLD AUTO: 5.1 K/UL — SIGNIFICANT CHANGE UP (ref 1.8–7.4)
NEUTROPHILS NFR BLD AUTO: 69 % — SIGNIFICANT CHANGE UP (ref 43–77)
NON HDL CHOLESTEROL: 203 MG/DL — HIGH
NRBC # BLD: 0 /100 WBCS — SIGNIFICANT CHANGE UP (ref 0–0)
PLATELET # BLD AUTO: 171 K/UL — SIGNIFICANT CHANGE UP (ref 150–400)
POTASSIUM SERPL-MCNC: 4.4 MMOL/L — SIGNIFICANT CHANGE UP (ref 3.5–5.3)
POTASSIUM SERPL-SCNC: 4.4 MMOL/L — SIGNIFICANT CHANGE UP (ref 3.5–5.3)
PROT SERPL-MCNC: 7.2 G/DL — SIGNIFICANT CHANGE UP (ref 6–8.3)
PROTHROM AB SERPL-ACNC: 12.3 SEC — SIGNIFICANT CHANGE UP (ref 10.6–13.6)
RBC # BLD: 4.63 M/UL — SIGNIFICANT CHANGE UP (ref 4.2–5.8)
RBC # FLD: 13.4 % — SIGNIFICANT CHANGE UP (ref 10.3–14.5)
SARS-COV-2 RNA SPEC QL NAA+PROBE: SIGNIFICANT CHANGE UP
SODIUM SERPL-SCNC: 138 MMOL/L — SIGNIFICANT CHANGE UP (ref 135–145)
TRIGL SERPL-MCNC: 306 MG/DL — HIGH
TROPONIN I SERPL-MCNC: <0.015 NG/ML — SIGNIFICANT CHANGE UP (ref 0–0.04)
TSH SERPL-MCNC: 3.85 UU/ML — SIGNIFICANT CHANGE UP (ref 0.34–4.82)
VIT B12 SERPL-MCNC: 1330 PG/ML — HIGH (ref 232–1245)
WBC # BLD: 7.39 K/UL — SIGNIFICANT CHANGE UP (ref 3.8–10.5)
WBC # FLD AUTO: 7.39 K/UL — SIGNIFICANT CHANGE UP (ref 3.8–10.5)

## 2021-01-07 PROCEDURE — 71045 X-RAY EXAM CHEST 1 VIEW: CPT | Mod: 26

## 2021-01-07 PROCEDURE — 70498 CT ANGIOGRAPHY NECK: CPT | Mod: 26

## 2021-01-07 PROCEDURE — 70496 CT ANGIOGRAPHY HEAD: CPT | Mod: 26

## 2021-01-07 PROCEDURE — 93306 TTE W/DOPPLER COMPLETE: CPT | Mod: 26

## 2021-01-07 PROCEDURE — 99285 EMERGENCY DEPT VISIT HI MDM: CPT

## 2021-01-07 PROCEDURE — 70450 CT HEAD/BRAIN W/O DYE: CPT | Mod: 26,59

## 2021-01-07 PROCEDURE — 93010 ELECTROCARDIOGRAM REPORT: CPT

## 2021-01-07 RX ORDER — LEVOTHYROXINE SODIUM 125 MCG
1 TABLET ORAL
Qty: 0 | Refills: 0 | DISCHARGE

## 2021-01-07 RX ORDER — SERTRALINE 25 MG/1
25 TABLET, FILM COATED ORAL DAILY
Refills: 0 | Status: DISCONTINUED | OUTPATIENT
Start: 2021-01-07 | End: 2021-01-08

## 2021-01-07 RX ORDER — ASPIRIN/CALCIUM CARB/MAGNESIUM 324 MG
300 TABLET ORAL DAILY
Refills: 0 | Status: DISCONTINUED | OUTPATIENT
Start: 2021-01-07 | End: 2021-01-07

## 2021-01-07 RX ORDER — ASPIRIN/CALCIUM CARB/MAGNESIUM 324 MG
81 TABLET ORAL DAILY
Refills: 0 | Status: DISCONTINUED | OUTPATIENT
Start: 2021-01-07 | End: 2021-01-08

## 2021-01-07 RX ORDER — ATORVASTATIN CALCIUM 80 MG/1
40 TABLET, FILM COATED ORAL AT BEDTIME
Refills: 0 | Status: DISCONTINUED | OUTPATIENT
Start: 2021-01-07 | End: 2021-01-08

## 2021-01-07 RX ORDER — SERTRALINE 25 MG/1
2.5 TABLET, FILM COATED ORAL
Qty: 0 | Refills: 0 | DISCHARGE

## 2021-01-07 RX ORDER — FUROSEMIDE 40 MG
1 TABLET ORAL
Qty: 0 | Refills: 0 | DISCHARGE

## 2021-01-07 RX ORDER — ASPIRIN/CALCIUM CARB/MAGNESIUM 324 MG
81 TABLET ORAL DAILY
Refills: 0 | Status: DISCONTINUED | OUTPATIENT
Start: 2021-01-07 | End: 2021-01-07

## 2021-01-07 RX ORDER — POLYETHYLENE GLYCOL 3350 17 G/17G
0 POWDER, FOR SOLUTION ORAL
Qty: 0 | Refills: 0 | DISCHARGE

## 2021-01-07 RX ORDER — CLOPIDOGREL BISULFATE 75 MG/1
75 TABLET, FILM COATED ORAL DAILY
Refills: 0 | Status: DISCONTINUED | OUTPATIENT
Start: 2021-01-07 | End: 2021-01-08

## 2021-01-07 RX ORDER — ENOXAPARIN SODIUM 100 MG/ML
40 INJECTION SUBCUTANEOUS DAILY
Refills: 0 | Status: DISCONTINUED | OUTPATIENT
Start: 2021-01-07 | End: 2021-01-08

## 2021-01-07 RX ORDER — FOLIC ACID 0.8 MG
1 TABLET ORAL
Qty: 0 | Refills: 0 | DISCHARGE

## 2021-01-07 RX ORDER — LEVOTHYROXINE SODIUM 125 MCG
25 TABLET ORAL DAILY
Refills: 0 | Status: DISCONTINUED | OUTPATIENT
Start: 2021-01-07 | End: 2021-01-08

## 2021-01-07 RX ADMIN — Medication 81 MILLIGRAM(S): at 11:29

## 2021-01-07 NOTE — ED PROVIDER NOTE - NEUROLOGICAL, MLM
Alert and disoriented, no focal deficits, unsteady gait, require support while standing but able to bear full weigh.

## 2021-01-07 NOTE — ED PROVIDER NOTE - MUSCULOSKELETAL, MLM
Spine appears normal, range of motion is not limited, no muscle or joint tenderness, moving all extremity freely

## 2021-01-07 NOTE — ED PROVIDER NOTE - PROGRESS NOTE DETAILS
south: ct head shows an age indeterminate lacunar infarct. spoke with anoop and will admit to [facility] for further work up. pt clinically at baseline and unlikely new onset.

## 2021-01-07 NOTE — H&P ADULT - HISTORY OF PRESENT ILLNESS
84 yo female with medical history significant for dementia, depression, frequent falls, and hypothyroidism comes in from [facility] after he had a fall today. Patient is awake and alert but is not able to recall what happened. He states that he had fall 2 days ago, hit his head on right side. He does not remember what happened and is not sure if it happened today or 2 days ago. As per EMS, he fell from the wheelchair(skid) and hit his head. There was no LOC, syncope, seizure like activity, confusion. Patient at present denies any symptoms, no chest pain, dyspnea, abdominal pain, headache, dizziness, weakness. He states he has pain where he fell, on right side of the scalp. No h/o recent illness, sick contacts.

## 2021-01-07 NOTE — ED PROVIDER NOTE - OBJECTIVE STATEMENT
85 year old male with PMHx of dementia, depression, frequent falls, and hypothyroidism and no pertinent PSHx presents to the ED from [facility] slip and fall from wheelchair. pt unable to provide clear hx but states he was " walking and fell and hit right side of head." denies loc or any sx prior to fall.  after fall c/o right side of headache.  pt denies of dizziness but at triage stated + dizziness.  no ac use.

## 2021-01-07 NOTE — ED PROVIDER NOTE - CLINICAL SUMMARY MEDICAL DECISION MAKING FREE TEXT BOX
85 year old male with PMHx of dementia, depression, frequent falls, and hypothyroidism and no pertinent PSHx presents to the ED from [facility] slip and fall from wheelchair. pt unable to provide clear hx but states he was " walking and fell and hit right side of head." denies loc or any sx prior to fall.  after fall c/o right side of headache.  pt denies of dizziness but at triage stated + dizziness.  no ac use.    slip and fall per nsg home now c/o right parietal headache will perform ct head and ekg, fs.  neurologically at baseline and pt exam shows no injury. if neg can dc back to [facility]

## 2021-01-07 NOTE — ED ADULT TRIAGE NOTE - CHIEF COMPLAINT QUOTE
pt slid off chair while sitting x 30 mins. No obvious injury noted. pt c/o dizziness. pt slid off chair while sitting x 30 mins. No obvious injury noted. pt c/o dizziness. Sent from [facility]

## 2021-01-07 NOTE — H&P ADULT - ASSESSMENT
[age] yo female with medical history significant for dementia, depression, frequent falls, and hypothyroidism comes in from [facility] after he had a fall today.    ED:  CT head showed  Age-indeterminate lacunar infarct in the left thalamus, possibly new since prior studies. Consider MRI for further evaluation.  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    Patient is being admitted to [facility] for age indeterminate lacunar stroke.

## 2021-01-07 NOTE — H&P ADULT - PROBLEM SELECTOR PLAN 1
Presented after a fall at NH, skid from wheelchair, hit his head, no LOC  OE has pronator drift and coordination loss, NIHSS-7  CT head showed age indeterminate lacunar stroke  Will admit to tele for stroke w/u  EKG showed junctional rhythm, cEx1 negative  Cardio- Dr cabrera  Neuro-dr charles  will do cta head n neck  f/u echo  passed dysphagia screen  Started on asa, statin, plavix  f/u PT eval, SNS

## 2021-01-07 NOTE — ED ADULT NURSE NOTE - INTERVENTIONS DEFINITIONS
Call bell, personal items and telephone within reach/Stretcher in lowest position, wheels locked, appropriate side rails in place/Provide visual clues: red socks

## 2021-01-07 NOTE — ED ADULT NURSE NOTE - OBJECTIVE STATEMENT
BIB [facility] for post fall evaluation, unable to recall incident, c/o rt sided headache and dizziness no head trauma LOC noted.

## 2021-01-07 NOTE — H&P ADULT - NSHPPHYSICALEXAM_GEN_ALL_CORE
Vital Signs (24 Hrs):  T(C): 36.7 (01-07-21 @ 11:08), Max: 36.9 (01-07-21 @ 09:08)  HR: 54 (01-07-21 @ 11:08) (54 - 54)  BP: 140/64 (01-07-21 @ 11:08) (138/82 - 140/64)  RR: 17 (01-07-21 @ 11:08) (17 - 20)  SpO2: 98% (01-07-21 @ 11:08) (95% - 98%)

## 2021-01-07 NOTE — ED ADULT NURSE NOTE - CHIEF COMPLAINT QUOTE
pt slid off chair while sitting x 30 mins. No obvious injury noted. pt c/o dizziness. Sent from [facility]

## 2021-01-07 NOTE — H&P ADULT - PROBLEM SELECTOR PLAN 4
Has h/o dementia  Not on any medication  Will recommend f/u neurology as OP for evaluation of dementia and starting medications

## 2021-01-08 VITALS
HEART RATE: 70 BPM | SYSTOLIC BLOOD PRESSURE: 121 MMHG | OXYGEN SATURATION: 98 % | DIASTOLIC BLOOD PRESSURE: 71 MMHG | RESPIRATION RATE: 18 BRPM | TEMPERATURE: 98 F

## 2021-01-08 LAB
CK MB BLD-MCNC: <1.6 % — SIGNIFICANT CHANGE UP (ref 0–3.5)
CK MB CFR SERPL CALC: <1 NG/ML — SIGNIFICANT CHANGE UP (ref 0–3.6)
CK SERPL-CCNC: 64 U/L — SIGNIFICANT CHANGE UP (ref 35–232)
TROPONIN I SERPL-MCNC: <0.015 NG/ML — SIGNIFICANT CHANGE UP (ref 0–0.04)

## 2021-01-08 PROCEDURE — 70551 MRI BRAIN STEM W/O DYE: CPT | Mod: 26

## 2021-01-08 PROCEDURE — 99223 1ST HOSP IP/OBS HIGH 75: CPT

## 2021-01-08 RX ORDER — CLOPIDOGREL BISULFATE 75 MG/1
1 TABLET, FILM COATED ORAL
Qty: 21 | Refills: 0
Start: 2021-01-08 | End: 2021-01-28

## 2021-01-08 RX ORDER — ASPIRIN/CALCIUM CARB/MAGNESIUM 324 MG
1 TABLET ORAL
Qty: 90 | Refills: 0
Start: 2021-01-08 | End: 2021-04-07

## 2021-01-08 RX ORDER — ATORVASTATIN CALCIUM 80 MG/1
1 TABLET, FILM COATED ORAL
Qty: 30 | Refills: 0
Start: 2021-01-08 | End: 2021-02-06

## 2021-01-08 RX ORDER — GEMFIBROZIL 600 MG
1 TABLET ORAL
Qty: 60 | Refills: 0
Start: 2021-01-08 | End: 2021-02-06

## 2021-01-08 RX ORDER — GEMFIBROZIL 600 MG
600 TABLET ORAL
Refills: 0 | Status: DISCONTINUED | OUTPATIENT
Start: 2021-01-08 | End: 2021-01-08

## 2021-01-08 RX ADMIN — Medication 81 MILLIGRAM(S): at 11:28

## 2021-01-08 RX ADMIN — CLOPIDOGREL BISULFATE 75 MILLIGRAM(S): 75 TABLET, FILM COATED ORAL at 11:28

## 2021-01-08 RX ADMIN — Medication 600 MILLIGRAM(S): at 17:34

## 2021-01-08 RX ADMIN — ENOXAPARIN SODIUM 40 MILLIGRAM(S): 100 INJECTION SUBCUTANEOUS at 11:28

## 2021-01-08 RX ADMIN — SERTRALINE 25 MILLIGRAM(S): 25 TABLET, FILM COATED ORAL at 11:28

## 2021-01-08 NOTE — PHYSICAL THERAPY INITIAL EVALUATION ADULT - PERTINENT HX OF CURRENT PROBLEM, REHAB EVAL
with medical history significant for dementia, depression, frequent falls, and hypothyroidism comes in from [facility] after he had a fall today.

## 2021-01-08 NOTE — DISCHARGE NOTE PROVIDER - CARE PROVIDERS DIRECT ADDRESSES
[email or web address]

## 2021-01-08 NOTE — SWALLOW BEDSIDE ASSESSMENT ADULT - SLP PERTINENT HISTORY OF CURRENT PROBLEM
84 yo male with PMHx of dementia, depression, frequent falls, and hypothyroidism comes in from [facility] after he had a fall 1/7. Patient is awake and alert but is not able to recall what happened. He states that he had fall 2 days ago, hit his head on right side. He does not remember what happened and is not sure if it happened today or 2 days ago. As per EMS, he fell from the wheelchair(skid) and hit his head. There was no LOC, syncope, seizure like activity, confusion. Patient at present denies any symptoms, no chest pain, dyspnea, abdominal pain, headache, dizziness, weakness. He states he has pain where he fell, on right side of the scalp. No h/o recent illness, sick contacts.

## 2021-01-08 NOTE — DISCHARGE NOTE PROVIDER - PROVIDER TOKENS
PROVIDER:[TOKEN:[[id number]],FOLLOWUP:[2 weeks],ESTABLISHEDPATIENT:[T]],PROVIDER:[TOKEN:[[id number]],FOLLOWUP:[2 weeks],ESTABLISHEDPATIENT:[T]]

## 2021-01-08 NOTE — CONSULT NOTE ADULT - SUBJECTIVE AND OBJECTIVE BOX
History per Admission H&P 1/7/2021.    <Start of quote from H&P>  "History of Present Illness:   84 yo female with medical history significant for dementia, depression, frequent falls, and hypothyroidism comes in from [facility] after he had a fall today. Patient is awake and alert but is not able to recall what happened. He states that he had fall 2 days ago, hit his head on right side. He does not remember what happened and is not sure if it happened today or 2 days ago. As per EMS, he fell from the wheelchair(skid) and hit his head. There was no LOC, syncope, seizure like activity, confusion. Patient at present denies any symptoms, no chest pain, dyspnea, abdominal pain, headache, dizziness, weakness. He states he has pain where he fell, on right side of the scalp. No h/o recent illness, sick contacts.     Review of Systems:  Other Review of Systems: All other review of systems negative, except as noted in HPI      Allergies and Intolerances:        Allergies:  	No Known Allergies:     Home Medications:   * Patient Currently Takes Medications as of 09-Jun-2020 11:09 documented in Structured Notes  · 	levothyroxine 25 mcg (0.025 mg) oral tablet: 1 tab(s) orally once a day  · 	folic acid 1 mg oral tablet: 1 tab(s) orally once a day  · 	furosemide 20 mg oral tablet: 1 tab(s) orally once a day  · 	polyethylene glycol 3350 oral powder for reconstitution:   · 	sertraline 25 mg oral tablet: 2.5 tab(s) orally once a day    .    Patient History:    Past Medical, Past Surgical, and Family History:  PAST MEDICAL HISTORY:  Dementia     Depression     Frequent falls     Hypothyroid.     PAST SURGICAL HISTORY:  No significant past surgical history.     Social History:  Social History (marital status, living situation, occupation, tobacco use, alcohol and drug use, and sexual history): Denies smoking, denies drinking alcohol     Tobacco Screening:  · Core Measure Site	Yes  · Has the patient used tobacco in the past 30 days?	No    Risk Assessment:    Present on Admission:  Deep Venous Thrombosis	no  Pulmonary Embolus	no     Heart Failure:  Does this patient have a history of or has been diagnosed with heart failure? no."  <End of quote from H&P>    He was seen in the ED on previous occasions for falls.        EXAMINATION    Awake, alert.  Found semi-recumbent in bed.  He gives date as Friday (correct) January 8, 2001.  Aske what was last year he answers 2000.  Answer as to current location [place].  [place] [facility]  The reason we are wearing masks is "polluted air;" he explains the masks remove pollution.  Mentioning virus and pandemic produces no glimmer of recognition; he sticks to the pollution idea.  Several times mentions he served in the [employer] and how he used a rifle.  PERRL; EOMs full; confrontation visual fields grossly intact.      Weak in all four limbs (estimate ~25% of normal); weaker on the R side.  Tendency to mitgehen on confrontation testing of strength.   Somewhat clumsy Jorge.    Reflex                           Right    Left   Comment    Scapulohumeral               1        0-tr  Pectoralis                        2         2  Biceps                             2-        2-  Triceps                            1-        1-  López                      absent  absent  Patellar                            tr        tr  Gastroc                            1        0  Plantar                         flexor  extensor    FNF (eyes open/closed) he is on target (took explaining and demonstrating several times for him to understand what to do).  HKS on target (required physically moving his LE through the motion to get him to perform the test).    P/LT sensation grossly intact.    Vibration sensation fairly good in fingers, greatly impaired at ankles, absent in toes.     He can bicycle recumbent in bed.      On attempting to assist him to sit up at the edge of the mattress (in order to then get him to stand) he keeps falling backward en bloc from the seated position.    Sometime after my first unsuccessful attempt, on returning to his room, Pt did just manage to stand up from the edge of the mattress after several full-forward Mason maneuvers.  Needs support to then walk; stride length < foot length; shuffling; partially stooped.        Selected test results    B12/folate  1330/>20  TSH  3.85  Hgb A1c  5.1%    From report of 1/7/21 non-con head CT  "COMPARISON: CT head 2/7/2020.    FINDINGS:  No acute transcortical infarction or acute intracranial hemorrhage. Age-indeterminate lacunar infarct in the left thalamus, possibly new since prior studies. Chronic lacunar infarct in the right thalamus.    White matter hypoattenuating foci are noted, compatible with age-indeterminate small vessel disease.    There is again noted atrophy preferentially involving the left anterior and lateral temporal lobe with associated prominence of the left temporal horn.    No hydrocephalus. Redemonstrated 3.6 cm arachnoid cysts in the right middle cranial fossa with associated mass effect upon the right temporal pole.    The visualized intraorbital contents are normal. The imaged portions of the paranasal sinuses are clear. The mastoid air cells are clear. The visualized soft tissues and osseous structures appear normal."    From report of brain MR 1/8/21  "Comparison CT performed the prior day    There is predominantly central volume loss and mild chronic microvascular ischemic change without mass effect, cortical edema or hydrocephalus. There is a tiny dominant chronic lacunar infarct in the right thalamus. There is a small arachnoid cyst in the right middle cranial fossa. There is no evidence of acute infarct or previous parenchymal hemorrhage. The orbital and sellar contents and cerebellar tonsils are within normal limits.    IMPRESSION:  No acute findings."    Of note: from the report of 2/7/20 head and c-spine CTs  "CT BRAIN:     There is no acute intracranial mass-effect, hemorrhage, or midline shift. Gray-white differentiation is maintained.    Right middle cranial fossa arachnoid cyst is noted.    Mild central volume loss is noted. No hydrocephalus. Basal cisterns are patent.     Visualized paranasal sinuses  and mastoid air cells are clear. The calvarium is intact.     CT CERVICAL SPINE:     There is no prevertebral soft tissue swelling. Vertebral body heights are maintained.    Grade 1 retrolisthesis of C3 in relation to C2 and C4 is noted. Grade 1 retrolisthesis of C4 on C5 and C5 on C6 with grade 1 anterolisthesis of C6 on C7 and C7 on T1.    Advanced disc degeneration throughout the cervical spine with complete loss of disc height at C3-C4, C4-C5, C5-C6, C6-C7.    The atlantodental and atlanto-occipital joints are maintained. Articular facets and posterior elements alignment is maintained.     The partially imaged lung apices are clear.     IMPRESSION:     CT BRAIN: No acute intracranial bleeding. Right middle cranial fossa arachnoid cyst.    CT CERVICAL SPINE: No fracture. Multilevel spondylosis and spondylolisthesis, as above."

## 2021-01-08 NOTE — DISCHARGE NOTE PROVIDER - CARE PROVIDER_API CALL
Nova Coffey)  Neurology  [address], [address] [place] [address]  [place] [address]  Phone: [phone]  Fax: [phone]  Established Patient  Follow Up Time: 2 weeks    Linnea Viveros  INTERNAL MEDICINE  [address] [place] [address]  Phone: [phone]  Fax: [phone]  Established Patient  Follow Up Time: 2 weeks

## 2021-01-08 NOTE — DISCHARGE NOTE PROVIDER - NSDCCPCAREPLAN_GEN_ALL_CORE_FT
PRINCIPAL DISCHARGE DIAGNOSIS  Diagnosis: CVA (cerebrovascular accident)  Assessment and Plan of Treatment: You came to the hospital after a fall out of your wheelchair in which you hit your head. You did not lose consciousness. CT imaging of your head showed possibly new stroke in the left thalamus. MRI showed _________.  Start taking ASPIRIN 81mg ONCE A DAY long-term and PLAVIX 75mg ONCE A DAY for 21 DAYS. Follow up with a neurologist in 2 weeks to review these medications and adjust as needed.  Additionally, your cholesterol, triglycerides, and LDL (types of fats in the blood) were high, which can increase risk of future stroke. Start taking ATORVASTATIN 40mg ONCE A DAY and GEMFIBROZIL 600mg TWICE A DAY. Monitor yourself for symptoms of new muscle pain and weakness, upper abdominal discomfort, and   Imaging of the blood vessels in your head and neck showed narrowing at parts of the right internal carotid artery in your head, but no narrowing in the neck. Echo showed mild-moderate relaxation difficulties, but overall indicated good cardiac function. Continue your home Lasix as previously.   CTA h/n with narrowing in distal r ICA at proximal cavernous segment and supraclinoid, no stenosis in neck vessels noted. TTE with G2DD and normal LV function, unlikely PFO/ASD, although technically difficult study. Started aspirin and 21 days Plavix. Outpatient neuro follow up recommended for continued management post-CVA and dementia.   Lipid panel notable for elevated TG, cholesterol, and LDL, started statin and gemfibrozil.         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    Diagnosis: Hypothyroidism  Assessment and Plan of Treatment: Continue your home Synthroid without change. You thyroid hormone levels were at goal this admission.     PRINCIPAL DISCHARGE DIAGNOSIS  Diagnosis: CVA (cerebrovascular accident)  Assessment and Plan of Treatment: You came to the hospital after a fall out of your wheelchair in which you hit your head. You did not lose consciousness. CT imaging of your head showed possibly new stroke in the left thalamus. MRI showed _________.  Start taking ASPIRIN 81mg ONCE A DAY long-term and PLAVIX 75mg ONCE A DAY for 21 DAYS. Follow up with a neurologist in 2 weeks to review these medications and adjust as needed.  Additionally, your cholesterol, triglycerides, and LDL (types of fats in the blood) were high, which can increase risk of future stroke. Start taking ATORVASTATIN 40mg ONCE A DAY and GEMFIBROZIL 600mg TWICE A DAY. Monitor yourself for symptoms of new muscle pain and weakness, upper abdominal discomfort, and decreased or dark urine, as these may be signs of serious side effects. Seek medical attention if these occur.  Imaging of the blood vessels in your head and neck showed narrowing at parts of the right internal carotid artery in your head, but no narrowing in the neck. Echo showed mild-moderate relaxation difficulties, but overall indicated good cardiac function. Continue your home Lasix as previously. Follow up with cardiologist in 2 weeks for management of medications and discussion of need for further cardiac imaging.      SECONDARY DISCHARGE DIAGNOSES  Diagnosis: Dementia  Assessment and Plan of Treatment: You have been experiencing forgetfulness, and imaging of your head was notable for some changes, especially on the left side, that are concerning for dementia. During your neurology follow up in 2 weeks for further stroke monitoring, we strongly recommend that you also discuss evaluation and management for dementia. Continue to take your home folic acid.  Depression may worsen dementia. Continue to take your home sertraline.    Diagnosis: Hypothyroidism  Assessment and Plan of Treatment: Continue your home Synthroid without change. You thyroid hormone levels were at goal this admission.     PRINCIPAL DISCHARGE DIAGNOSIS  Diagnosis: History of lacunar cerebrovascular accident  Assessment and Plan of Treatment: You came to the hospital after a fall out of your wheelchair in which you hit your head. You did not lose consciousness. CT imaging of your head showed possibly new stroke in the left thalamus. MRI showed no acute processes, suggesting that the stroke on CT was old. Start taking ASPIRIN 81mg ONCE A DAY long-term and PLAVIX 75mg ONCE A DAY for 21 DAYS. Follow up with a neurologist in 2 weeks to review these medications and adjust as needed.  Additionally, your cholesterol, triglycerides, and LDL (types of fats in the blood) were high, which can increase risk of future stroke. Start taking ATORVASTATIN 40mg ONCE A DAY and GEMFIBROZIL 600mg TWICE A DAY. Monitor yourself for symptoms of new muscle pain and weakness, upper abdominal discomfort, and decreased or dark urine, as these may be signs of serious side effects. Seek medical attention if these occur.  Imaging of the blood vessels in your head and neck showed narrowing at parts of the right internal carotid artery in your head, but no narrowing in the neck. Echo showed mild-moderate relaxation difficulties, but overall indicated good cardiac function. Continue your home Lasix as previously. Follow up with cardiologist in 2 weeks for management of medications and discussion of need for further cardiac imaging.      SECONDARY DISCHARGE DIAGNOSES  Diagnosis: Dementia  Assessment and Plan of Treatment: You have been experiencing forgetfulness, and imaging of your head was notable for some changes, especially on the left side, that are concerning for dementia. During your neurology follow up in 2 weeks for further stroke monitoring, we strongly recommend that you also discuss evaluation and management for dementia. Continue to take your home folic acid.  Depression may worsen dementia. Continue to take your home sertraline.    Diagnosis: Hypothyroidism  Assessment and Plan of Treatment: Continue your home Synthroid without change. You thyroid hormone levels were at goal this admission.     PRINCIPAL DISCHARGE DIAGNOSIS  Diagnosis: History of lacunar cerebrovascular accident  Assessment and Plan of Treatment: You came to the hospital after a fall out of your wheelchair in which you hit your head. You did not lose consciousness. CT imaging of your head showed possibly new stroke in the left thalamus. MRI showed no acute processes, suggesting that the stroke on CT was old. Start taking ASPIRIN 81mg ONCE A DAY long-term and PLAVIX 75mg ONCE A DAY for 21 DAYS. Follow up with a neurologist in 2 weeks to review these medications and adjust as needed.  Additionally, your cholesterol, triglycerides, and LDL (types of fats in the blood) were high, which can increase risk of future stroke. Start taking ATORVASTATIN 40mg ONCE A DAY and GEMFIBROZIL 600mg TWICE A DAY. Monitor yourself for symptoms of new muscle pain and weakness, upper abdominal discomfort, and decreased or dark urine, as these may be signs of serious side effects. Seek medical attention if these occur.  Imaging of the blood vessels in your head and neck showed narrowing at parts of the right internal carotid artery in your head, but no narrowing in the neck. Echo showed mild-moderate relaxation difficulties, but overall indicated good cardiac function. Continue your home Lasix as previously. Follow up with cardiologist in 2 weeks for management of medications and discussion of need for further cardiac imaging.      SECONDARY DISCHARGE DIAGNOSES  Diagnosis: Fall  Assessment and Plan of Treatment: You came to the hospital after having fallen out of your wheelchair. Our neurologist Dr. Lima is concerned that you may have pathology in your spine that is causing these difficulities. We recommend that you arrange to have an MRI of your cervical spine outpatient. Discuss this with your neurologist during your follow up appointment.    Diagnosis: Dementia  Assessment and Plan of Treatment: You have been experiencing forgetfulness, and imaging of your head was notable for some changes, especially on the left side, that are concerning for dementia. During your neurology follow up in 2 weeks for further stroke monitoring, we strongly recommend that you also discuss evaluation and management for dementia. Continue to take your home folic acid.  Depression may worsen dementia. Continue to take your home sertraline.    Diagnosis: Hypothyroidism  Assessment and Plan of Treatment: Continue your home Synthroid without change. You thyroid hormone levels were at goal this admission.     PRINCIPAL DISCHARGE DIAGNOSIS  Diagnosis: History of lacunar cerebrovascular accident  Assessment and Plan of Treatment: You came to the hospital after a fall out of your wheelchair in which you hit your head. You did not lose consciousness. CT imaging of your head showed possibly new stroke in the left thalamus. MRI showed no acute processes, suggesting that the stroke on CT was old. Start taking ASPIRIN 81mg ONCE A DAY long-term and PLAVIX 75mg ONCE A DAY for 21 DAYS. Follow up with a neurologist in 2 weeks to review these medications and adjust as needed.  Additionally, your cholesterol, triglycerides, and LDL (types of fats in the blood) were high, which can increase risk of future stroke. Start taking ATORVASTATIN 40mg ONCE A DAY and GEMFIBROZIL 600mg TWICE A DAY. Monitor yourself for symptoms of new muscle pain and weakness, upper abdominal discomfort, and decreased or dark urine, as these may be signs of serious side effects. Seek medical attention if these occur.  Imaging of the blood vessels in your head and neck showed narrowing at parts of the right internal carotid artery in your head, but no narrowing in the neck. Echo showed mild-moderate relaxation difficulties, but overall indicated good cardiac function. Continue your home Lasix as previously. Follow up with cardiologist in 2 weeks for management of medications and discussion of need for further cardiac imaging.      SECONDARY DISCHARGE DIAGNOSES  Diagnosis: Fall  Assessment and Plan of Treatment: You came to the hospital after having fallen out of your wheelchair. Our neurologist Dr. Lima is concerned that you may have pathology in your spine that is causing these difficulities. We recommend that you arrange to have an MRI of your cervical spine outpatient. Discuss this with your neurologist during your follow up appointment. In the meantime, you will require supervision. Do NOT get out of bed without assistance.    Diagnosis: Dementia  Assessment and Plan of Treatment: You have been experiencing forgetfulness, and imaging of your head was notable for some changes, especially on the left side, that are concerning for dementia. During your neurology follow up in 2 weeks for further stroke monitoring, we strongly recommend that you also discuss evaluation and management for dementia. Continue to take your home folic acid.  Depression may worsen dementia. Continue to take your home sertraline.    Diagnosis: Hypothyroidism  Assessment and Plan of Treatment: Continue your home Synthroid without change. You thyroid hormone levels were at goal this admission.

## 2021-01-08 NOTE — SWALLOW BEDSIDE ASSESSMENT ADULT - SWALLOW EVAL: RECOMMENDED FEEDING/EATING TECHNIQUES
allow for swallow between intakes/alternate food with liquid/maintain upright posture during/after eating for 30 mins/position upright (90 degrees)

## 2021-01-08 NOTE — SWALLOW BEDSIDE ASSESSMENT ADULT - SWALLOW EVAL: DIAGNOSIS
Pt p/w adequate oral & pharyngeal phases of chew & swallow for tolerating regular diet: Good labial seal, Functional bolus manipulation & propulsion, Unremarkable oral phase; reduced laryngeal elevation palpated, Timely/efficient oropharyngeal swallow with no overt s/s of laryngeal penetration or aspiration at this exam.

## 2021-01-08 NOTE — DISCHARGE NOTE NURSING/CASE MANAGEMENT/SOCIAL WORK - PATIENT PORTAL LINK FT
You can access the FollowMyHealth Patient Portal offered by [facility] by registering at the following website: [email or web address]. By joining [facility]’s FollowMyHealth portal, you will also be able to view your health information using other applications (apps) compatible with our system.

## 2021-01-08 NOTE — DISCHARGE NOTE PROVIDER - NSDCMRMEDTOKEN_GEN_ALL_CORE_FT
aspirin 81 mg oral delayed release tablet: 1 tab(s) orally once a day  atorvastatin 40 mg oral tablet: 1 tab(s) orally once a day  clopidogrel 75 mg oral tablet: 1 tab(s) orally once a day  folic acid 1 mg oral tablet: 1 tab(s) orally once a day  furosemide 20 mg oral tablet: 1 tab(s) orally once a day  gemfibrozil 600 mg oral tablet: 1 tab(s) orally 2 times a day  levothyroxine 25 mcg (0.025 mg) oral tablet: 1 tab(s) orally once a day  polyethylene glycol 3350 oral powder for reconstitution:   sertraline 25 mg oral tablet: 2.5 tab(s) orally once a day

## 2021-01-08 NOTE — DISCHARGE NOTE PROVIDER - HOSPITAL COURSE
85M PMH dementia, depression, frequent falls, and hypothyroidism presented 1/7 from [facility] s/p fall 2/2 wheelchair skid, +HS, no LOC/syncope or seizure. A&Ox3 on presentation, with residual right-sided head pain and some forgetfulness. CXR unremarkable. EKG with junctional rhythm, trops negative x2. Admitted to [facility] [place] for possible CVA.     Passed SS, started on regular diet with thin liquids. CTH with possibly new left thalamic lacunar infarct, MRI brain with___________. 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 CTA h/n with narrowing in distal r ICA at proximal cavernous segment and supraclinoid, no stenosis in neck vessels noted. TTE with G2DD and normal LV function, unlikely PFO/ASD, although technically difficult study. Started aspirin and 21 days Plavix. Outpatient neuro follow up recommended for continued management post-CVA and dementia.     Lipid panel notable for elevated TG, cholesterol, and LDL, started statin and gemfibrozil.     TSH wnl, home levothyroxine continued. Home PT.    85M PMH dementia, depression, frequent falls, and hypothyroidism presented 1/7 from [facility] s/p fall 2/2 wheelchair skid, +HS, no LOC/syncope or seizure. A&Ox3 on presentation, with residual right-sided head pain and some forgetfulness. CXR unremarkable. EKG with junctional rhythm, trops negative x2. Admitted to [facility] for possible CVA.     Passed SS, started on regular diet with thin liquids. CTH with possibly new left thalamic lacunar infarct, MRI brain without acute findings. 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 CTA h/n with narrowing in distal r ICA at proximal cavernous segment and supraclinoid, no stenosis in neck vessels noted. TTE with G2DD and normal LV function, unlikely PFO/ASD, although technically difficult study. Started aspirin and 21 days Plavix. Outpatient neuro follow up recommended for continued management post-CVA and dementia.     Lipid panel notable for elevated TG, cholesterol, and LDL, started statin and gemfibrozil.     TSH wnl, home levothyroxine continued. Home PT.    85M PMH dementia, depression, frequent falls, and hypothyroidism presented 1/7 from [facility] s/p fall 2/2 wheelchair skid, +HS, no LOC/syncope or seizure. A&Ox3 on presentation, with residual right-sided head pain and some forgetfulness. CXR unremarkable. EKG with junctional rhythm, trops negative x2. Admitted to [facility] for possible CVA.     Passed SS, started on regular diet with thin liquids. CTH with possibly new left thalamic lacunar infarct, MRI brain without acute findings. 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 CTA h/n with narrowing in distal r ICA at proximal cavernous segment and supraclinoid, no stenosis in neck vessels noted. TTE with G2DD and normal LV function, unlikely PFO/ASD, although technically difficult study. Started aspirin and 21 days Plavix. Outpatient neuro follow up recommended for MRI c-spine to evaluate ambulatory difficulties, continued management post-CVA, and workup for dementia.     Lipid panel notable for elevated TG, cholesterol, and LDL, started statin and gemfibrozil.     TSH wnl, home levothyroxine continued. Home PT.

## 2021-01-08 NOTE — CONSULT NOTE ADULT - ASSESSMENT
Dementia.    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      Frontal gait disorder.    Multiple falls due to myelopathy + frontal gait disorder.      Pt is unsafe to be walking on his own.        RECOMMENDATIONS    Refer for out-Pt neurosurgical evaluation for cervical myelopathy (may be advisable to obtain C-spine MR in advance).

## 2021-01-09 LAB
SARS-COV-2 IGG SERPL QL IA: POSITIVE
SARS-COV-2 IGM SERPL IA-ACNC: 3.28 INDEX — HIGH

## 2021-01-21 PROCEDURE — 70450 CT HEAD/BRAIN W/O DYE: CPT

## 2021-01-21 PROCEDURE — 83036 HEMOGLOBIN GLYCOSYLATED A1C: CPT

## 2021-01-21 PROCEDURE — 82962 GLUCOSE BLOOD TEST: CPT

## 2021-01-21 PROCEDURE — 93306 TTE W/DOPPLER COMPLETE: CPT

## 2021-01-21 PROCEDURE — 71045 X-RAY EXAM CHEST 1 VIEW: CPT

## 2021-01-21 PROCEDURE — 82607 VITAMIN B-12: CPT

## 2021-01-21 PROCEDURE — 84443 ASSAY THYROID STIM HORMONE: CPT

## 2021-01-21 PROCEDURE — 93005 ELECTROCARDIOGRAM TRACING: CPT

## 2021-01-21 PROCEDURE — 92610 EVALUATE SWALLOWING FUNCTION: CPT

## 2021-01-21 PROCEDURE — 86769 SARS-COV-2 COVID-19 ANTIBODY: CPT

## 2021-01-21 PROCEDURE — 70496 CT ANGIOGRAPHY HEAD: CPT

## 2021-01-21 PROCEDURE — 82550 ASSAY OF CK (CPK): CPT

## 2021-01-21 PROCEDURE — 80053 COMPREHEN METABOLIC PANEL: CPT

## 2021-01-21 PROCEDURE — 87635 SARS-COV-2 COVID-19 AMP PRB: CPT

## 2021-01-21 PROCEDURE — 70498 CT ANGIOGRAPHY NECK: CPT

## 2021-01-21 PROCEDURE — 99285 EMERGENCY DEPT VISIT HI MDM: CPT | Mod: 25

## 2021-01-21 PROCEDURE — 36415 COLL VENOUS BLD VENIPUNCTURE: CPT

## 2021-01-21 PROCEDURE — 70551 MRI BRAIN STEM W/O DYE: CPT

## 2021-01-21 PROCEDURE — 82553 CREATINE MB FRACTION: CPT

## 2021-01-21 PROCEDURE — 85610 PROTHROMBIN TIME: CPT

## 2021-01-21 PROCEDURE — U0005: CPT

## 2021-01-21 PROCEDURE — 82746 ASSAY OF FOLIC ACID SERUM: CPT

## 2021-01-21 PROCEDURE — 84484 ASSAY OF TROPONIN QUANT: CPT

## 2021-01-21 PROCEDURE — 85025 COMPLETE CBC W/AUTO DIFF WBC: CPT

## 2021-01-21 PROCEDURE — 80061 LIPID PANEL: CPT

## 2022-07-01 NOTE — ED ADULT TRIAGE NOTE - NS ED NURSE BANDS TYPE
"Neurosurgery  POD# 2  Preoperative LE symptoms much improved  Ambulating  Voiding  Tolerating oral medications  Denies nausea, vomiting  Pain controlled on current medication regimen  Leg symptoms improved  Saddle numbness relieved  Hemovac: Clotted. Has not been draining  He has cleared PT    Objective:  /75   Pulse 63   Temp 36.8 °C (98.2 °F) (Temporal)   Resp 16   Ht 1.753 m (5' 9\")   Wt 96.8 kg (213 lb 6.5 oz)   SpO2 98%     Intake/Output Summary (Last 24 hours) at 6/30/2022 0715  Last data filed at 6/30/2022 0400  Gross per 24 hour   Intake 2690 ml   Output 0 ml   Net 2690 ml       Recent Labs     06/30/22  0237 07/01/22  0200   WBC 8.0 8.8   RBC 3.59* 3.16*   HEMOGLOBIN 12.9* 11.3*   HEMATOCRIT 35.6* 31.1*   MCV 99.2* 98.4*   MCH 35.9* 35.8*   MCHC 36.2* 36.3*   RDW 43.4 43.7   PLATELETCT 145* 125*   MPV 8.9* 9.6     Recent Labs     06/30/22  0237   SODIUM 134*   POTASSIUM 4.6   CHLORIDE 97   CO2 26   GLUCOSE 148*   BUN 23*     Recent Labs     06/30/22  0237 07/01/22  0200   APTT 26.0 25.2   INR 1.17* 1.05     Wound: Steri strips in place. Minor drainage. No obvious dehiscence  -no swelling. No ecchymosis  Hemovac with 0cc/8hr am- we changed distal tubing and vacuum canister without improvement, there is clot in the proximal tubing    Strength:  Lower extremities are 5/5 grossly  Sensation grossly intact  Otherwise neurologically intact    Labs stable   VSS    Assessment:  Active Hospital Problems    Diagnosis    • Lumbar stenosis with neurogenic claudication [M48.062]    • Neurogenic claudication due to lumbar spinal stenosis [M48.062]      Added automatically from request for surgery [id number]       POD#2 S/p L1-S1 laminectomy  Chemoprophylaxis: No    Patient counseled on postoperative instructions:  -he is to call or report to ER with any increasing saddle anesthesia or new severe pain/weakness in Lower extremities    Plan:  1. OT eval this am  2. D/C hemovac and On Q this am  3. Meds to Beds for " scripts on Discharge  4. D/C home this am at 1000hrs  5. Place new island dressing on discharge   Normal for race Name band;

## 2023-02-16 NOTE — PROGRESS NOTE ADULT - PROBLEM SELECTOR PLAN 5
DVT and GI prophylaxis.  Medically stable for discharge.  Awaiting negative COVID results.
DVT and GI prophylaxis.  Medically stable for discharge.  Awaiting negative COVID results.
DVT and GI prophylaxis.  Medically stable for discharge.  Repeat COVID test tomorrow.
Pt. is from [facility], likely to return to [facility]  Pt. will need COVID19 negative   Resulted positive 5/20, retest 5/23
Pt. is from [facility], likely to return to [facility]  Pt. will need COVID19 negative x 2    5/18, 5/20-  COVID19-positive.
Pt. is from [facility], likely to return to [facility]  Pt. will need COVID19 negative x 2    5/18, 5/20-  COVID19-positive.
c/w Sertraline  Supportive measures
c/w Sertraline  Supportive measures
Could not be transferred until COVID negative times 2  Awaiting discharge back to assisted living.
Pt. is from [facility], likely to return to [facility]  Pt. will need COVID19 negative x 2    5/18 COVID19-positive
Pt. is from [facility], likely to return to [facility]  Pt. will need COVID19 negative x 2    5/18 COVID19-positive
c/w Sertraline  Supportive measures
Implemented All Fall with Harm Risk Interventions:  [place] to call system. Call bell, personal items and telephone within reach. Instruct patient to call for assistance. Room bathroom lighting operational. Non-slip footwear when patient is off stretcher. Physically safe environment: no spills, clutter or unnecessary equipment. Stretcher in lowest position, wheels locked, appropriate side rails in place. Provide visual cue, wrist band, yellow gown, etc. Monitor gait and stability. Monitor for mental status changes and reorient to person, place, and time. Review medications for side effects contributing to fall risk. Reinforce activity limits and safety measures with patient and family. Provide visual clues: red socks.

## 2023-04-20 NOTE — DIETITIAN INITIAL EVALUATION ADULT. - PROBLEM/PLAN-4
Impression: Other mucopurulent conjunctivitis, left eye: H10.022.  Plan: Resolved DISPLAY PLAN FREE TEXT

## 2023-09-19 NOTE — ED ADULT TRIAGE NOTE - NS ED TRIAGE AVPU SCALE
Alert-The patient is alert, awake and responds to voice. The patient is oriented to time, place, and person. The triage nurse is able to obtain subjective information. Manual Repair Warning Statement: We plan on removing the manually selected variable below in favor of our much easier automatic structured text blocks found in the previous tab. We decided to do this to help make the flow better and give you the full power of structured data. Manual selection is never going to be ideal in our platform and I would encourage you to avoid using manual selection from this point on, especially since I will be sunsetting this feature. It is important that you do one of two things with the customized text below. First, you can save all of the text in a word file so you can have it for future reference. Second, transfer the text to the appropriate area in the Library tab. Lastly, if there is a flap or graft type which we do not have you need to let us know right away so I can add it in before the variable is hidden. No need to panic, we plan to give you roughly 6 months to make the change.

## 2024-05-01 NOTE — PROGRESS NOTE ADULT - SUBJECTIVE AND OBJECTIVE BOX
CARDIOLOGY/MEDICAL ATTENDING    CHIEF COMPLAINT:Patient is a 85y old  Male who presents with a chief complaint of Fall.Pt appears comfortable.    	  REVIEW OF SYSTEMS:  CONSTITUTIONAL: No fever, weight loss, or fatigue  EYES: No eye pain, visual disturbances, or discharge  ENT:  No difficulty hearing, tinnitus, vertigo; No sinus or throat pain  NECK: No pain or stiffness  RESPIRATORY: No cough, wheezing, chills or hemoptysis; No Shortness of Breath  CARDIOVASCULAR: No chest pain, palpitations, passing out, dizziness, or leg swelling  GASTROINTESTINAL: No abdominal or epigastric pain. No nausea, vomiting, or hematemesis; No diarrhea or constipation. No melena or hematochezia.  GENITOURINARY: No dysuria, frequency, hematuria, or incontinence  NEUROLOGICAL: No headaches, memory loss, loss of strength, numbness, or tremors  SKIN: No itching, burning, rashes, or lesions   LYMPH Nodes: No enlarged glands  ENDOCRINE: No heat or cold intolerance; No hair loss  MUSCULOSKELETAL: No joint pain or swelling; No muscle, back, or extremity pain  PSYCHIATRIC: No depression, anxiety, mood swings, or difficulty sleeping  HEME/LYMPH: No easy bruising, or bleeding gums  ALLERGY AND IMMUNOLOGIC: No hives or eczema	      PHYSICAL EXAM:  T(C): 36.9 (01-08-21 @ 08:05), Max: 36.9 (01-08-21 @ 08:05)  HR: 53 (01-08-21 @ 08:05) (51 - 59)  BP: 132/72 (01-08-21 @ 08:05) (121/63 - 158/76)  RR: 18 (01-08-21 @ 08:05) (17 - 18)  SpO2: 95% (01-08-21 @ 08:05) (95% - 100%)  Wt(kg): --  I&O's Summary    07 Jan 2021 07:01  -  08 Jan 2021 07:00  --------------------------------------------------------  IN: 0 mL / OUT: 450 mL / NET: -450 mL        Appearance: Normal	  HEENT:   Normal oral mucosa, PERRL, EOMI	  Lymphatic: No lymphadenopathy  Cardiovascular: Normal S1 S2, No JVD, No murmurs, No edema  Respiratory: Lungs clear to auscultation	  Psychiatry: A & O x 3, Mood & affect appropriate  Gastrointestinal:  Soft, Non-tender, + BS	  Skin: No rashes, No ecchymoses, No cyanosis	  Neurologic: Non-focal  Extremities: Normal range of motion, No clubbing, cyanosis or edema  Vascular: Peripheral pulses palpable 2+ bilaterally    MEDICATIONS  (STANDING):  aspirin enteric coated 81 milliGRAM(s) Oral daily  atorvastatin 40 milliGRAM(s) Oral at bedtime  clopidogrel Tablet 75 milliGRAM(s) Oral daily  enoxaparin Injectable 40 milliGRAM(s) SubCutaneous daily  levothyroxine 25 MICROGram(s) Oral daily  sertraline 25 milliGRAM(s) Oral daily      TELEMETRY: 	sinus bradycardia 40's      ECG:  	sinus bradycardia,nl axis  	  	  LABS:	 	    CARDIAC MARKERS ( 07 Jan 2021 22:57 )  <0.015 ng/mL / x     / 64 U/L / x     / <1.0 ng/mL  CARDIAC MARKERS ( 07 Jan 2021 13:15 )  <0.015 ng/mL / x     / 65 U/L / x     / 1.1 ng/mL                         13.6   7.39  )-----------( 171      ( 07 Jan 2021 11:27 )             41.4     01-07    138  |  102  |  28<H>  ----------------------------<  81  4.4   |  29  |  1.14    Ca    9.3      07 Jan 2021 11:27    TPro  7.2  /  Alb  3.0<L>  /  TBili  0.5  /  DBili  x   /  AST  12  /  ALT  19  /  AlkPhos  139<H>  01-07      Lipid Profile: Cholesterol 249  LDL --  HDL 46        TSH: Thyroid Stimulating Hormone, Serum: 3.85 uU/mL (01-07 @ 14:15)      	                     EXAM:  CT ANGIO BRAIN (W)AW                             PROCEDURE DATE:  01/07/2021          INTERPRETATION:  CLINICAL STATEMENT: Stroke    TECHNIQUE: CTA of the head and neck performed with IV contrast.  3-D MIP imaging obtained. Approximately 90 cc of Omnipaque 350 administered.    COMPARISON: None.    FINDINGS:  CTA of the neck:  The common carotid and internal carotid arteries are patent without hemodynamically significant stenosis. Atherosclerotic plaques carotid bulbs noted    The extracranial vertebral arteries are patent. Mild narrowing noted at the proximal left vertebral artery.    Degenerative changes noted.    CTA Head:  Narrowing at the proximal cavernous segment of the distal right internal carotid artery noted. Narrowing terminal segment of distal right internal carotid artery.    The proximal anterior, middle and posterior cerebral arteries are patent without hemodynamically significant stenosis.    The intracranial vertebral and basilar arteries are patent.    There is no intracranial aneurysm.    CT Head:  Moderate diffuse parenchymal volume loss. Hypodensities periventricular white matter which may be related to red marrow hyperplasia in the correct clinicalsetting.    Arachnoid cyst noted in the right middle cranial fossa with mass effect on the adjacent right temporal lobe.    Chronic lacunar infarct right thalamus    There is no acute intracranial hemorrhage, parenchymal mass, or midline shift. Thereis no acute territorial infarct. There is no hydrocephalus.    The cranium is intact. The visualized paranasal sinuses are well-aerated.    IMPRESSION:  No hemodynamically significant stenosis in the neck.    Narrowing at the proximal cavernous segment of distal right internal carotid artery.    Narrowing at the supraclinoid distal right internal carotid artery.            YOSI FOUNTAIN MD; Attending Radiologist  This document has been electronically signed. Jan 7 2021  4:12PM          EXAM:  CT BRAIN                            PROCEDURE DATE:  01/07/2021          INTERPRETATION:  CLINICAL INDICATION: head injury. .    TECHNIQUE: CT of the head was performed without the administration of intravenous contrast.    COMPARISON: CT head 2/7/2020.    FINDINGS:  No acute transcortical infarction or acute intracranial hemorrhage. Age-indeterminate lacunar infarct in the left thalamus, possibly new since prior studies. Chronic lacunar infarct in the right thalamus.    White matter hypoattenuating foci are noted, compatible with age-indeterminate small vessel disease.    There is again noted atrophy preferentially involving the left anterior and lateral temporal lobe with associated prominence of the left temporal horn.    No hydrocephalus. Redemonstrated 3.6 cm arachnoid cysts in the right middle cranial fossa with associated mass effect upon the right temporal pole.    The visualized intraorbital contents are normal. The imaged portions of the paranasal sinuses are clear. Themastoid air cells are clear. The visualized soft tissues and osseous structures appear normal.    IMPRESSION:    -Age-indeterminate lacunar infarct in the left thalamus, possibly new since prior studies. Consider MRI for further evaluation.    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     Transthoracic Echocardiogram (01.07.21 @ 12:45) >  OBSERVATIONS:  Mitral Valve: Mild posterior mitral annular calcification.  Mitral valve not well visualized.  Aortic Root: Normal aortic root.  Aortic Valve: Aortic valve not well visualized. No aortic  stenosis. Mild to moderate aortic regurgitation.  Left Atrium: Normal left atrium.  LA volume index = 19  cc/m2.  Left Ventricle: Normal Left Ventricular Systolic Function,  (EF = 60 to 65% by visual estimation) Not allLV wall  segments were seen. Normal left ventricular internal  dimensions and wall thicknesses. Grade II diastolic  dysfunction.  Right Heart: Right atrium not well visualized. Right  ventricle not well visualized. Normal RV systolic function  (TAPSE2.2 cm). Tricuspid valve not well seen. Pulmonic  valve not well seen. No pulmonic insufficiency is noted.  Pericardium/PleuraNo pericardial effusion. A prominent  epicardial fat pad is noted.  Hemodynamic: Insufficient tricuspid regurgitation jet to  allow calculation of RVSP.  Agitated saline injection was  probably negative for intracardiac shunt based on  technically difficult images. Consider OBIE for further  assessment if clinically indicated.             No Yes

## 2024-09-03 NOTE — PROGRESS NOTE ADULT - ASSESSMENT
85 yr old male with HX of Dementia,Hypothyroidism,Depression here with fall,weakness, bradycardia and ? new CVA.  1.Tele  monitoring.  2.Neurology eval,  3.Orthostatic bp q shift.  4.CVA-asa,plavix,statin.  5.Hypothyroidism-synthroid.  6.Lipid d/o-statin,add lopid.  7.Depression-zoloft.  8.PT eval.  9.GI and DVT prophylaxis. No

## 2024-09-19 ENCOUNTER — APPOINTMENT (OUTPATIENT)
Dept: OTOLARYNGOLOGY | Facility: CLINIC | Age: 89
End: 2024-09-19

## 2024-11-07 NOTE — SWALLOW BEDSIDE ASSESSMENT ADULT - DATE
Pt called back and scheduled for ED&C    Sofia TORRES RN  Dermatology   [phone]     08-Jan-2021
